# Patient Record
Sex: FEMALE | Race: WHITE | NOT HISPANIC OR LATINO | Employment: OTHER | URBAN - METROPOLITAN AREA
[De-identification: names, ages, dates, MRNs, and addresses within clinical notes are randomized per-mention and may not be internally consistent; named-entity substitution may affect disease eponyms.]

---

## 2017-02-08 RX ORDER — PREDNISONE 20 MG/1
TABLET ORAL
Qty: 90 TABLET | OUTPATIENT
Start: 2017-02-08

## 2017-02-10 ENCOUNTER — OFFICE VISIT (OUTPATIENT)
Dept: INTERNAL MEDICINE | Facility: CLINIC | Age: 57
End: 2017-02-10
Payer: MEDICARE

## 2017-02-10 VITALS
RESPIRATION RATE: 20 BRPM | HEART RATE: 88 BPM | DIASTOLIC BLOOD PRESSURE: 86 MMHG | OXYGEN SATURATION: 95 % | BODY MASS INDEX: 40.72 KG/M2 | SYSTOLIC BLOOD PRESSURE: 128 MMHG | WEIGHT: 194 LBS | HEIGHT: 58 IN

## 2017-02-10 DIAGNOSIS — M32.8 OTHER FORMS OF SYSTEMIC LUPUS ERYTHEMATOSUS, UNSPECIFIED ORGAN INVOLVEMENT STATUS: Primary | ICD-10-CM

## 2017-02-10 DIAGNOSIS — Z71.6 TOBACCO ABUSE COUNSELING: ICD-10-CM

## 2017-02-10 DIAGNOSIS — M85.80 OSTEOPENIA: ICD-10-CM

## 2017-02-10 DIAGNOSIS — M79.7 FIBROMYALGIA: ICD-10-CM

## 2017-02-10 DIAGNOSIS — F32.A DEPRESSION, UNSPECIFIED DEPRESSION TYPE: ICD-10-CM

## 2017-02-10 DIAGNOSIS — F11.20 UNCOMPLICATED OPIOID DEPENDENCE: ICD-10-CM

## 2017-02-10 DIAGNOSIS — K58.0 IRRITABLE BOWEL SYNDROME WITH DIARRHEA: ICD-10-CM

## 2017-02-10 DIAGNOSIS — I73.00 RAYNAUD'S DISEASE WITHOUT GANGRENE: ICD-10-CM

## 2017-02-10 DIAGNOSIS — Z72.0 TOBACCO ABUSE: ICD-10-CM

## 2017-02-10 PROCEDURE — 99999 PR PBB SHADOW E&M-EST. PATIENT-LVL IV: CPT | Mod: PBBFAC,,, | Performed by: NURSE PRACTITIONER

## 2017-02-10 PROCEDURE — 3074F SYST BP LT 130 MM HG: CPT | Mod: S$GLB,,, | Performed by: NURSE PRACTITIONER

## 2017-02-10 PROCEDURE — 99214 OFFICE O/P EST MOD 30 MIN: CPT | Mod: S$GLB,,, | Performed by: NURSE PRACTITIONER

## 2017-02-10 PROCEDURE — 3079F DIAST BP 80-89 MM HG: CPT | Mod: S$GLB,,, | Performed by: NURSE PRACTITIONER

## 2017-02-10 PROCEDURE — 99499 UNLISTED E&M SERVICE: CPT | Mod: S$GLB,,, | Performed by: NURSE PRACTITIONER

## 2017-02-10 RX ORDER — PREDNISONE 20 MG/1
20 TABLET ORAL DAILY
Qty: 90 TABLET | Refills: 0 | Status: SHIPPED | OUTPATIENT
Start: 2017-02-10 | End: 2017-08-01 | Stop reason: SDUPTHER

## 2017-02-10 RX ORDER — BUPROPION HYDROCHLORIDE 150 MG/1
150 TABLET ORAL DAILY
Qty: 30 TABLET | Refills: 5 | Status: SHIPPED | OUTPATIENT
Start: 2017-02-10 | End: 2017-08-01

## 2017-02-10 RX ORDER — AMLODIPINE BESYLATE 5 MG/1
5 TABLET ORAL 2 TIMES DAILY
Qty: 180 TABLET | Refills: 1 | Status: SHIPPED | OUTPATIENT
Start: 2017-02-10 | End: 2017-12-27 | Stop reason: SDUPTHER

## 2017-02-10 NOTE — MR AVS SNAPSHOT
[facility]  [facility]  [place] [address]  Phone: [phone]  Fax: [phone]                  Lena Zamarripa   2/10/2017 10:45 AM   Office Visit    Description:  Female : 1960   Provider:  Kathy Sánchez NP   Department:  [facility]           Reason for Visit     Follow-up     Depression           Diagnoses this Visit        Comments    Other forms of systemic lupus erythematosus, unspecified organ involvement status    -  Primary     Fibromyalgia         Irritable bowel syndrome with diarrhea         Depression, unspecified depression type         Uncomplicated opioid dependence         Raynaud's disease without gangrene         Osteopenia         BMI 40.0-44.9, adult         Tobacco abuse         Tobacco abuse counseling                To Do List           Goals (5 Years of Data)     None      Follow-Up and Disposition     Return if symptoms worsen or fail to improve.       These Medications        Disp Refills Start End    buPROPion (WELLBUTRIN XL) 150 MG TB24 tablet 30 tablet 5 2/10/2017 2/10/2018    Take 1 tablet (150 mg total) by mouth once daily. - Oral    Pharmacy: [facility] - [place], [place] - [address] Ph #: [phone]       amlodipine (NORVASC) 5 MG tablet 180 tablet 1 2/10/2017     Take 1 tablet (5 mg total) by mouth 2 (two) times daily. - Oral    Pharmacy: [facility] - [place], [place] - [address] Ph #: [phone]       predniSONE (DELTASONE) 20 MG tablet 90 tablet 0 2/10/2017     Take 1 tablet (20 mg total) by mouth once daily. - Oral    Pharmacy: [facility] - [place], [place] - [address] Ph #: [phone]         [facility] [place] [facility]     [facility] Care Line -  Assistance  Registered nurses in the [facility] provide clinical advisement, health education, appointment booking, and other advisory services.  Call for this free service at [phone].             Medications            Message regarding Medications     Verify the changes and/or additions to your medication regime listed below are the same as discussed with your clinician today.  If any of these changes or additions are incorrect, please notify your healthcare provider.        START taking these NEW medications        Refills    buPROPion (WELLBUTRIN XL) 150 MG TB24 tablet 5    Sig: Take 1 tablet (150 mg total) by mouth once daily.    Class: Normal    Route: Oral      CHANGE how you are taking these medications     Start Taking Instead of    predniSONE (DELTASONE) 20 MG tablet predniSONE (DELTASONE) 20 MG tablet    Dosage:  Take 1 tablet (20 mg total) by mouth once daily. Dosage:  TAKE 1 TABLET BY MOUTH EVERY DAY    Reason for Change:  Reorder            Verify that the below list of medications is an accurate representation of the medications you are currently taking.  If none reported, the list may be blank. If incorrect, please contact your healthcare provider. Carry this list with you in case of emergency.           Current Medications     amlodipine (NORVASC) 5 MG tablet Take 1 tablet (5 mg total) by mouth 2 (two) times daily.    CALCIUM CARB/MAGNESIUM CMB #10 (EDWINA-MAG ORAL) Take 2 capsules by mouth once daily.    chlorhexidine (PERIDEX) 0.12 % solution     diphenoxylate-atropine 2.5-0.025 mg (LOMOTIL) 2.5-0.025 mg per tablet Take 1 tablet by mouth 4 (four) times daily as needed for Diarrhea.    fentanyl (DURAGESIC) 12 mcg/hr PT72     fentaNYL (DURAGESIC) 25 mcg/hr 1 patch every 48 hours.     gabapentin (NEURONTIN) 300 MG capsule     hydrocodone-acetaminophen 10-325mg (NORCO)  mg Tab Take 1 tablet by mouth 3 (three) times daily.     hydroxychloroquine (PLAQUENIL) 200 mg tablet Take 200 mg by mouth 2 (two) times daily.    LORATADINE (ALAVERT ORAL) Take by mouth.    multivitamin (ONE DAILY MULTIVITAMIN) per tablet Take 1 tablet by mouth once daily.    OMEPRAZOLE/SODIUM BICARBONATE (ZEGERID ORAL) Take by mouth.     "predniSONE (DELTASONE) 20 MG tablet Take 1 tablet (20 mg total) by mouth once daily.    tizanidine (ZANAFLEX) 4 MG tablet Take 2 mg by mouth every 6 (six) hours as needed.     vitamin D 1000 units Tab Take 1,000 Units by mouth once daily.    buPROPion (WELLBUTRIN XL) 150 MG TB24 tablet Take 1 tablet (150 mg total) by mouth once daily.    triamcinolone acetonide 0.5% (KENALOG) 0.5 % Crea Apply topically 2 (two) times daily.    valacyclovir (VALTREX) 1000 MG tablet Take 1 tablet (1,000 mg total) by mouth 3 (three) times daily.           Clinical Reference Information           Your Vitals Were     BP Pulse Resp Height Weight Last Period    128/86 88 20 4' 10" (1.473 m) 88 kg (194 lb) 12/31/1999    SpO2 BMI             95% 40.55 kg/m2         Blood Pressure          Most Recent Value    BP  128/86      Allergies as of 2/10/2017     Peanut    Penicillins      Immunizations Administered on Date of Encounter - 2/10/2017     None      Instructions      Depression  Depression is one of the most common mental health problems today. It is not just a state of unhappiness or sadness. It is a true disease. The cause seems to be related to a decrease in chemicals that transmit signals in the brain. Having a family history of depression, alcoholism, or suicide increases the risk. Chronic illness, chronic pain, migraine headaches and high emotional stress also increase the risk.  Depression is something we tend to recognize in others, but may have a hard time seeing in ourselves. It can show in many physical and emotional ways:  · Loss of appetite  · Over-eating  · Not being able to sleep  · Sleeping too much  · Tiredness not related to physical exertion  · Restlessness or irritability  · Slowness of movement or speech  · Feeling depressed or withdrawn  · Loss of interest in things you once enjoyed  · Trouble concentrating, poor memory, trouble making decisions  · Thoughts of harming or killing oneself, or thoughts that life is not " worth living  · Low self-esteem  The treatment for depression may include both medicine and psychotherapy. Antidepressants can reduce suffering and can improve the ability to function during the depressed period. Therapy can offer emotional support and help you understand emotional factors that may be causing the depression.  Home care  · On-going care and support helps people manage this disease.  Find a healthcare provider and therapist who meet your needs. Seek help when you feel like you may be getting ill.  · Be kind to yourself. Make it a point to do things that you enjoy (gardening, walking in nature, going to a movie, etc.). Reward yourself for small successes.  · Take care of your physical body. Eat a balanced diet (low in saturated fat and high in fruits and vegetables). Exercise at least 3 times a week for 30 minutes. Even mild-moderate exercise (like brisk walking) can make you feel better.  · Avoid alcohol, which can make depression worse.  · Take medicine as prescribed.  · Tell each of your healthcare providers about all of the prescription drugs, over-the-counter medicines, vitamins, and supplements you take. Certain supplements interact with medicines and can result in dangerous side effects. Ask your pharmacist when you have questions about drug interactions.  · Talk with your family and trusted friends about your feelings and thoughts. Ask them to help you recognize behavior changes early so you can get help and, if needed, medicine can be adjusted.  Follow-up care  Follow up with your healthcare provider, or as advised.  Call 911  Call 911 if you:  · Have suicidal thoughts, a suicide plan, and the means to carry out the plan  · Have trouble breathing  · Are very confused  · Feel very drowsy or have trouble awakening  · Faint or lose consciousness  · Have new chest pain that becomes more severe, lasts longer, or spreads into your shoulder, arm, neck, jaw or back  When to seek medical advice  Call  your healthcare provider right away if any of these occur:  · Feeling extreme depression, fear, anxiety, or anger toward yourself or others  · Feeling out of control  · Feeling that you may try to harm yourself or another  · Hearing voices that others do not hear  · Seeing things that others do not see  · Cant sleep or eat for 3 days in a row  · Friends or family express concern over your behavior and ask you to seek help  Date Last Reviewed: 9/29/2015 © 2000-2016 [employer]. [address] [place] [address], [place] [address]. All rights reserved. This information is not intended as a substitute for professional medical care. Always follow your healthcare professional's instructions.        Depression Affects Your Mind and Body  Everyone feels sad or blue from time to time for a few days or weeks. Depression is when these feelings don't go away and they interfere with daily life.  Depression is a real illness. It makes you feel sad and helpless. It gets in the way of your life and relationships. It inhibits your ability to think and act. But, with help, you can feel better again.     When I was depressed, I felt awful. I was so tired all the time I could hardly think, but at night I couldnt fall asleep. My head hurt. My stomach hurt. I didnt know what was wrong with me.   Depression affects your whole body  Brain chemicals affect your body as well as your mood. So depression may do more than just make you feel low. You may also feel bad physically. Depression can:  · Cause trouble with mental tasks such as remembering, concentrating, or making decisions  · Make you feel nervous and jumpy  · Cause trouble sleeping. Or you may sleep too much  · Change your appetite  · Cause headaches, stomachaches, or other aches and pains  · Drain your body of energy  Depression and other illness  It is common for people who have chronic health problems to also have depression. It can often be hard to tell which one  caused the other. A person might become depressed after finding out they have a health problem. But some studies suggest being depressed may make certain health problems more likely. And some depressed people stop taking care of themselves. This may make them more likely to get sick.  Date Last Reviewed: 1/19/2015  © 8737-4837 [employer]. [address] [place] [address], [place] [address]. All rights reserved. This information is not intended as a substitute for professional medical care. Always follow your healthcare professional's instructions.        Planning to Quit Smoking  Your healthcare provider may have told you that you need to give up tobacco. Only you can decide if and when you are ready to quit. Quitting is hard to do. But the benefits will be worth it. When you  decide to quit, come up with a plan thats right for you. Discuss your plan with your healthcare provider. And talk to your provider about medicines to help you quit.    Line up support  To quit smoking, youll need a plan and some help. Pick a date within the next 2 to 4 weeks to quit. Use the time between now and that date to arrange for support.  · Classes and counselors. Quit-smoking classes  people like you through the process. Get to know others in a class, and support each other beyond the class. Telephone counseling also helps you keep on track. Ask your healthcare provider, local hospital, or public health department to put you in touch with a class and a phone counselor.  · Family and friends. Tell your family and friends about your quit date. Ask them to support your change. If they smoke, arrange to see them in smoke-free places. Forbid smoking in your home.     Finding something to replace cigarettes may be hard to do. Be aware that some things you choose may be as harmful as cigarettes:  · Smokeless (chewing) tobacco is just as harmful as regular tobacco. Tobacco should not be used as a substitute for  cigarettes.  · Herbal medicines or teas may affect how your body handles nicotine. Talk to your healthcare provider before using these products.  · E-cigarettes have less toxins than the smoke from a regular cigarette. But the FDA says that these devices may still have substances that can cause cancer. E-cigarettes are not well regulated. They have not been studied enough to know if they are a good aid to help you stop smoking. Talk with your healthcare provider befor using these products.      Quit-smoking products  Many products can help you quit smoking. Some are prescription medicines that help curb your cravings and withdrawal symptoms. Other products slowly lessen the level of nicotine your body absorbs. Nicotine is the highly addictive substance found in cigarettes, cigars, and chewing tobacco. Nicotine replacement products can help get your body used to slowly decreasing amounts of nicotine after you quit smoking. These products include a nicotine patch, gum, lozenge, nasal spray, and inhaler. Be sure you follow the directions for your medicine or product carefully. Your healthcare provider may tell you to start taking the prescription medicine a week before you plan to quit. Do not smoke while you use nicotine products. Doing so can be very harmful to your health.  For more information  · smokefree.gov/xkbo-fm-ym-expert  · National Cancer [place] Smoking Quitline: 877-44U-QUIT (712-756-5351)   Date Last Reviewed: 11/12/2014  © 3693-4302 [employer]. [address] [place] [address]. All rights reserved. This information is not intended as a substitute for professional medical care. Always follow your healthcare professional's instructions.             Smoking Cessation     If you would like to quit smoking:   You may be eligible for free services if you are a [place] resident and started smoking cigarettes before September 1, 1988.  Call the Smoking Cessation Trust (SCT) toll  free at (255) 118-7671 or (749) 587-4933.   Call 1-800-QUIT-NOW if you do not meet the above criteria.            Language Assistance Services     ATTENTION: Language assistance services are available, free of charge. Please call [phone].      ATENCIÓN: Si habla natachaañol, tiene a waller disposición servicios gratuitos de asistencia lingüística. Llame al [phone].     CHÚ Ý: N?u b?n nói Ti?ng Vi?t, có các d?ch v? h? tr? ngôn ng? mi?n phí dành cho b?n. G?i s? [phone].         [facility] complies with applicable Federal civil rights laws and does not discriminate on the basis of race, color, national origin, age, disability, or sex.

## 2017-02-10 NOTE — PROGRESS NOTES
Subjective:       Patient ID: Lena Zamarripa is a 57 y.o. female.    Chief Complaint: Follow-up (check up with refills) and Depression (consult for medication )    Patient is known, to me and presents with   Chief Complaint   Patient presents with    Follow-up     check up with refills    Depression     consult for medication    .  Denies chest pain and shortness of breath.  Patient presents with check up and is undergoing depression for a while. She is not suicidal or homicidal ideations. She has a lot of medical issues.  Does not want any blood work or screenings at this time due to cost.  HPI  Review of Systems   Constitutional: Negative for activity change, appetite change, fatigue, fever and unexpected weight change.   HENT: Negative for congestion, ear discharge, ear pain, hearing loss, postnasal drip and tinnitus.    Eyes: Negative for photophobia, pain and visual disturbance.   Respiratory: Negative for cough, shortness of breath, wheezing and stridor.    Cardiovascular: Negative for chest pain, palpitations and leg swelling.   Gastrointestinal: Negative for abdominal distention.   Genitourinary: Negative for difficulty urinating, dysuria, frequency, hematuria and urgency.   Musculoskeletal: Positive for arthralgias. Negative for back pain, gait problem, joint swelling and neck pain.   Skin: Negative.    Neurological: Negative for dizziness, seizures, syncope, weakness, light-headedness, numbness and headaches.   Hematological: Negative for adenopathy. Does not bruise/bleed easily.   Psychiatric/Behavioral: Negative for behavioral problems, confusion, hallucinations, sleep disturbance and suicidal ideas. The patient is not nervous/anxious.        Objective:      Physical Exam   Constitutional: She is oriented to person, place, and time. She appears well-developed and well-nourished. No distress.   HENT:   Head: Normocephalic and atraumatic.   Right Ear: External ear normal.   Left Ear: External ear  normal.   Mouth/Throat: Oropharynx is clear and moist. No oropharyngeal exudate.   Eyes: Conjunctivae and EOM are normal. Pupils are equal, round, and reactive to light. Right eye exhibits no discharge. Left eye exhibits no discharge.   Neck: Normal range of motion. Neck supple. No JVD present. No thyromegaly present.   Cardiovascular: Normal rate, regular rhythm, normal heart sounds and intact distal pulses.  Exam reveals no gallop and no friction rub.    No murmur heard.  Pulmonary/Chest: Effort normal and breath sounds normal. No stridor. No respiratory distress. She has no wheezes. She has no rales. She exhibits no tenderness.   Abdominal: Soft. Bowel sounds are normal. She exhibits no distension and no mass. There is no tenderness. There is no rebound.   Musculoskeletal: Normal range of motion. She exhibits no edema or tenderness.   Lymphadenopathy:     She has no cervical adenopathy.   Neurological: She is alert and oriented to person, place, and time. She has normal reflexes. She displays normal reflexes. No cranial nerve deficit. She exhibits normal muscle tone. Coordination normal.   Skin: Skin is warm and dry. No rash noted. No erythema. No pallor.   Psychiatric: She has a normal mood and affect. Her behavior is normal. Judgment and thought content normal.       Assessment:       1. Other forms of systemic lupus erythematosus, unspecified organ involvement status    2. Fibromyalgia    3. Irritable bowel syndrome with diarrhea    4. Depression, unspecified depression type    5. Uncomplicated opioid dependence    6. Raynaud's disease without gangrene    7. Osteopenia    8. BMI 40.0-44.9, adult    9. Tobacco abuse    10. Tobacco abuse counseling        Plan:   Lena was seen today for follow-up and depression.    Diagnoses and all orders for this visit:    Other forms of systemic lupus erythematosus, unspecified organ involvement status  -     predniSONE (DELTASONE) 20 MG tablet; Take 1 tablet (20 mg total)  "by mouth once daily.    Fibromyalgia    Irritable bowel syndrome with diarrhea    Depression, unspecified depression type  -     buPROPion (WELLBUTRIN XL) 150 MG TB24 tablet; Take 1 tablet (150 mg total) by mouth once daily.    Uncomplicated opioid dependence    Raynaud's disease without gangrene  -     amlodipine (NORVASC) 5 MG tablet; Take 1 tablet (5 mg total) by mouth 2 (two) times daily.    Osteopenia    BMI 40.0-44.9, adult    Tobacco abuse    Tobacco abuse counseling  "This note will not be shared with the patient.".  If any thoughts of suicide or homicide occur stop meds and go to ER  Continue with plan of care  RTC as scheduled  "

## 2017-02-10 NOTE — PATIENT INSTRUCTIONS
Depression  Depression is one of the most common mental health problems today. It is not just a state of unhappiness or sadness. It is a true disease. The cause seems to be related to a decrease in chemicals that transmit signals in the brain. Having a family history of depression, alcoholism, or suicide increases the risk. Chronic illness, chronic pain, migraine headaches and high emotional stress also increase the risk.  Depression is something we tend to recognize in others, but may have a hard time seeing in ourselves. It can show in many physical and emotional ways:  · Loss of appetite  · Over-eating  · Not being able to sleep  · Sleeping too much  · Tiredness not related to physical exertion  · Restlessness or irritability  · Slowness of movement or speech  · Feeling depressed or withdrawn  · Loss of interest in things you once enjoyed  · Trouble concentrating, poor memory, trouble making decisions  · Thoughts of harming or killing oneself, or thoughts that life is not worth living  · Low self-esteem  The treatment for depression may include both medicine and psychotherapy. Antidepressants can reduce suffering and can improve the ability to function during the depressed period. Therapy can offer emotional support and help you understand emotional factors that may be causing the depression.  Home care  · On-going care and support helps people manage this disease.  Find a healthcare provider and therapist who meet your needs. Seek help when you feel like you may be getting ill.  · Be kind to yourself. Make it a point to do things that you enjoy (gardening, walking in nature, going to a movie, etc.). Reward yourself for small successes.  · Take care of your physical body. Eat a balanced diet (low in saturated fat and high in fruits and vegetables). Exercise at least 3 times a week for 30 minutes. Even mild-moderate exercise (like brisk walking) can make you feel better.  · Avoid alcohol, which can make  depression worse.  · Take medicine as prescribed.  · Tell each of your healthcare providers about all of the prescription drugs, over-the-counter medicines, vitamins, and supplements you take. Certain supplements interact with medicines and can result in dangerous side effects. Ask your pharmacist when you have questions about drug interactions.  · Talk with your family and trusted friends about your feelings and thoughts. Ask them to help you recognize behavior changes early so you can get help and, if needed, medicine can be adjusted.  Follow-up care  Follow up with your healthcare provider, or as advised.  Call 911  Call 911 if you:  · Have suicidal thoughts, a suicide plan, and the means to carry out the plan  · Have trouble breathing  · Are very confused  · Feel very drowsy or have trouble awakening  · Faint or lose consciousness  · Have new chest pain that becomes more severe, lasts longer, or spreads into your shoulder, arm, neck, jaw or back  When to seek medical advice  Call your healthcare provider right away if any of these occur:  · Feeling extreme depression, fear, anxiety, or anger toward yourself or others  · Feeling out of control  · Feeling that you may try to harm yourself or another  · Hearing voices that others do not hear  · Seeing things that others do not see  · Cant sleep or eat for 3 days in a row  · Friends or family express concern over your behavior and ask you to seek help  Date Last Reviewed: 9/29/2015  © 5616-0850 [employer]. [address] [place] [address]. All rights reserved. This information is not intended as a substitute for professional medical care. Always follow your healthcare professional's instructions.        Depression Affects Your Mind and Body  Everyone feels sad or blue from time to time for a few days or weeks. Depression is when these feelings don't go away and they interfere with daily life.  Depression is a real illness. It makes you  feel sad and helpless. It gets in the way of your life and relationships. It inhibits your ability to think and act. But, with help, you can feel better again.     When I was depressed, I felt awful. I was so tired all the time I could hardly think, but at night I couldnt fall asleep. My head hurt. My stomach hurt. I didnt know what was wrong with me.   Depression affects your whole body  Brain chemicals affect your body as well as your mood. So depression may do more than just make you feel low. You may also feel bad physically. Depression can:  · Cause trouble with mental tasks such as remembering, concentrating, or making decisions  · Make you feel nervous and jumpy  · Cause trouble sleeping. Or you may sleep too much  · Change your appetite  · Cause headaches, stomachaches, or other aches and pains  · Drain your body of energy  Depression and other illness  It is common for people who have chronic health problems to also have depression. It can often be hard to tell which one caused the other. A person might become depressed after finding out they have a health problem. But some studies suggest being depressed may make certain health problems more likely. And some depressed people stop taking care of themselves. This may make them more likely to get sick.  Date Last Reviewed: 1/19/2015 © 2000-2016 [employer]. [address] [place] [address], [place] [address]. All rights reserved. This information is not intended as a substitute for professional medical care. Always follow your healthcare professional's instructions.        Planning to Quit Smoking  Your healthcare provider may have told you that you need to give up tobacco. Only you can decide if and when you are ready to quit. Quitting is hard to do. But the benefits will be worth it. When you  decide to quit, come up with a plan thats right for you. Discuss your plan with your healthcare provider. And talk to your provider about medicines to help  you quit.    Line up support  To quit smoking, youll need a plan and some help. Pick a date within the next 2 to 4 weeks to quit. Use the time between now and that date to arrange for support.  · Classes and counselors. Quit-smoking classes  people like you through the process. Get to know others in a class, and support each other beyond the class. Telephone counseling also helps you keep on track. Ask your healthcare provider, local hospital, or public health department to put you in touch with a class and a phone counselor.  · Family and friends. Tell your family and friends about your quit date. Ask them to support your change. If they smoke, arrange to see them in smoke-free places. Forbid smoking in your home.     Finding something to replace cigarettes may be hard to do. Be aware that some things you choose may be as harmful as cigarettes:  · Smokeless (chewing) tobacco is just as harmful as regular tobacco. Tobacco should not be used as a substitute for cigarettes.  · Herbal medicines or teas may affect how your body handles nicotine. Talk to your healthcare provider before using these products.  · E-cigarettes have less toxins than the smoke from a regular cigarette. But the FDA says that these devices may still have substances that can cause cancer. E-cigarettes are not well regulated. They have not been studied enough to know if they are a good aid to help you stop smoking. Talk with your healthcare provider befor using these products.      Quit-smoking products  Many products can help you quit smoking. Some are prescription medicines that help curb your cravings and withdrawal symptoms. Other products slowly lessen the level of nicotine your body absorbs. Nicotine is the highly addictive substance found in cigarettes, cigars, and chewing tobacco. Nicotine replacement products can help get your body used to slowly decreasing amounts of nicotine after you quit smoking. These products include a  nicotine patch, gum, lozenge, nasal spray, and inhaler. Be sure you follow the directions for your medicine or product carefully. Your healthcare provider may tell you to start taking the prescription medicine a week before you plan to quit. Do not smoke while you use nicotine products. Doing so can be very harmful to your health.  For more information  · smokefree.gov/tcoh-wv-ti-expert  · [employer] [place] Smoking Quitline: 877-44U-QUIT (963-002-4701)   Date Last Reviewed: 11/12/2014  © 6416-7843 [employer]. [address] [place] [address]. All rights reserved. This information is not intended as a substitute for professional medical care. Always follow your healthcare professional's instructions.

## 2017-02-13 ENCOUNTER — PATIENT MESSAGE (OUTPATIENT)
Dept: INTERNAL MEDICINE | Facility: CLINIC | Age: 57
End: 2017-02-13

## 2017-03-06 ENCOUNTER — PATIENT MESSAGE (OUTPATIENT)
Dept: INTERNAL MEDICINE | Facility: CLINIC | Age: 57
End: 2017-03-06

## 2017-03-06 ENCOUNTER — TELEPHONE (OUTPATIENT)
Dept: INTERNAL MEDICINE | Facility: CLINIC | Age: 57
End: 2017-03-06

## 2017-03-06 DIAGNOSIS — B96.89 ACUTE BACTERIAL BRONCHITIS: Primary | ICD-10-CM

## 2017-03-06 DIAGNOSIS — J20.8 ACUTE BACTERIAL BRONCHITIS: Primary | ICD-10-CM

## 2017-03-06 RX ORDER — AZITHROMYCIN 250 MG/1
TABLET, FILM COATED ORAL
Qty: 6 TABLET | Refills: 2 | Status: SHIPPED | OUTPATIENT
Start: 2017-03-06 | End: 2017-03-11

## 2017-05-19 ENCOUNTER — PATIENT MESSAGE (OUTPATIENT)
Dept: INTERNAL MEDICINE | Facility: CLINIC | Age: 57
End: 2017-05-19

## 2017-05-19 ENCOUNTER — TELEPHONE (OUTPATIENT)
Dept: INTERNAL MEDICINE | Facility: CLINIC | Age: 57
End: 2017-05-19

## 2017-05-19 RX ORDER — HYDROXYCHLOROQUINE SULFATE 200 MG/1
200 TABLET, FILM COATED ORAL 2 TIMES DAILY
Qty: 60 TABLET | Refills: 5 | Status: SHIPPED | OUTPATIENT
Start: 2017-05-19 | End: 2018-07-19

## 2017-05-19 RX ORDER — AZITHROMYCIN 250 MG/1
TABLET, FILM COATED ORAL
Qty: 6 TABLET | Refills: 0 | Status: SHIPPED | OUTPATIENT
Start: 2017-05-19 | End: 2017-05-24

## 2017-07-10 DIAGNOSIS — R19.7 DIARRHEA, UNSPECIFIED TYPE: ICD-10-CM

## 2017-07-10 RX ORDER — DIPHENOXYLATE HYDROCHLORIDE AND ATROPINE SULFATE 2.5; .025 MG/1; MG/1
1 TABLET ORAL 4 TIMES DAILY PRN
Qty: 30 TABLET | Refills: 5 | Status: SHIPPED | OUTPATIENT
Start: 2017-07-10 | End: 2018-04-19 | Stop reason: SDUPTHER

## 2017-07-10 NOTE — TELEPHONE ENCOUNTER
Requested Prescriptions     Pending Prescriptions Disp Refills    diphenoxylate-atropine 2.5-0.025 mg (LOMOTIL) 2.5-0.025 mg per tablet 30 tablet 5     Sig: Take 1 tablet by mouth 4 (four) times daily as needed for Diarrhea.

## 2017-07-17 ENCOUNTER — PATIENT MESSAGE (OUTPATIENT)
Dept: INTERNAL MEDICINE | Facility: CLINIC | Age: 57
End: 2017-07-17

## 2017-07-24 ENCOUNTER — PATIENT MESSAGE (OUTPATIENT)
Dept: INTERNAL MEDICINE | Facility: CLINIC | Age: 57
End: 2017-07-24

## 2017-07-24 DIAGNOSIS — I10 BENIGN ESSENTIAL HTN: Primary | ICD-10-CM

## 2017-07-24 DIAGNOSIS — M32.9 SYSTEMIC LUPUS ERYTHEMATOSUS, UNSPECIFIED SLE TYPE, UNSPECIFIED ORGAN INVOLVEMENT STATUS: ICD-10-CM

## 2017-07-25 ENCOUNTER — PATIENT MESSAGE (OUTPATIENT)
Dept: INTERNAL MEDICINE | Facility: CLINIC | Age: 57
End: 2017-07-25

## 2017-07-26 ENCOUNTER — PATIENT MESSAGE (OUTPATIENT)
Dept: INTERNAL MEDICINE | Facility: CLINIC | Age: 57
End: 2017-07-26

## 2017-07-27 LAB
CHOLESTEROL, TOTAL: 224 MG/DL (ref 100–199)
HDLC SERPL-MCNC: 53 MG/DL (ref 39–?)
LDLC SERPL CALC-MCNC: 117 MG/DL (ref 0–99)
TRIGL SERPL-MCNC: 270 MG/DL (ref 0–149)
VLDLC SERPL-MCNC: 54 MG/DL (ref 5–40)

## 2017-07-28 LAB
ALBUMIN SERPL-MCNC: 4.3 G/DL (ref 3.5–5.5)
ALBUMIN/GLOB SERPL: 1.9 {RATIO} (ref 1.2–2.2)
ALP SERPL-CCNC: 115 IU/L (ref 39–117)
ALT SERPL-CCNC: 22 IU/L (ref 0–32)
AMBIG ABBREV CMP 14 DEFAULT: NORMAL
AMBIG ABBREV LP DEFAULT: NORMAL
AST SERPL-CCNC: 19 IU/L (ref 0–40)
BASOPHILS # BLD AUTO: 0 X10E3/UL (ref 0–0.2)
BASOPHILS NFR BLD AUTO: 0 %
BILIRUB SERPL-MCNC: 0.3 MG/DL (ref 0–1.2)
BUN SERPL-MCNC: 13 MG/DL (ref 6–24)
BUN/CREAT SERPL: 18 (ref 9–23)
CALCIUM SERPL-MCNC: 9.9 MG/DL (ref 8.7–10.2)
CHLORIDE SERPL-SCNC: 104 MMOL/L (ref 96–106)
CHOLEST SERPL-MCNC: 224 MG/DL (ref 100–199)
CO2 SERPL-SCNC: 25 MMOL/L (ref 18–29)
CREAT SERPL-MCNC: 0.72 MG/DL (ref 0.57–1)
EOSINOPHIL # BLD AUTO: 1.4 X10E3/UL (ref 0–0.4)
EOSINOPHIL NFR BLD AUTO: 10 %
ERYTHROCYTE [DISTWIDTH] IN BLOOD BY AUTOMATED COUNT: 14.8 % (ref 12.3–15.4)
GLOBULIN SER CALC-MCNC: 2.3 G/DL (ref 1.5–4.5)
GLUCOSE SERPL-MCNC: 96 MG/DL (ref 65–99)
HCT VFR BLD AUTO: 47 % (ref 34–46.6)
HDLC SERPL-MCNC: 53 MG/DL
HGB BLD-MCNC: 14.9 G/DL (ref 11.1–15.9)
LDLC SERPL CALC-MCNC: 117 MG/DL (ref 0–99)
LYMPHOCYTES # BLD AUTO: 4.4 X10E3/UL (ref 0.7–3.1)
LYMPHOCYTES NFR BLD AUTO: 31 %
MCH RBC QN AUTO: 26.4 PG (ref 26.6–33)
MCHC RBC AUTO-ENTMCNC: 31.7 G/DL (ref 31.5–35.7)
MCV RBC AUTO: 83 FL (ref 79–97)
MONOCYTES # BLD AUTO: 1 X10E3/UL (ref 0.1–0.9)
MONOCYTES NFR BLD AUTO: 7 %
MORPHOLOGY BLD-IMP: ABNORMAL
NEUTROPHILS # BLD AUTO: 7.4 X10E3/UL (ref 1.4–7)
NEUTROPHILS NFR BLD AUTO: 52 %
PLATELET # BLD AUTO: 378 X10E3/UL (ref 150–379)
POTASSIUM SERPL-SCNC: 4.8 MMOL/L (ref 3.5–5.2)
PROT SERPL-MCNC: 6.6 G/DL (ref 6–8.5)
RBC # BLD AUTO: 5.64 X10E6/UL (ref 3.77–5.28)
SODIUM SERPL-SCNC: 148 MMOL/L (ref 134–144)
TRIGL SERPL-MCNC: 270 MG/DL (ref 0–149)
TSH SERPL DL<=0.005 MIU/L-ACNC: 2.05 UIU/ML (ref 0.45–4.5)
VLDLC SERPL CALC-MCNC: 54 MG/DL (ref 5–40)
WBC # BLD AUTO: 14.2 X10E3/UL (ref 3.4–10.8)

## 2017-08-01 ENCOUNTER — OFFICE VISIT (OUTPATIENT)
Dept: INTERNAL MEDICINE | Facility: CLINIC | Age: 57
End: 2017-08-01
Payer: MEDICARE

## 2017-08-01 VITALS
HEART RATE: 105 BPM | SYSTOLIC BLOOD PRESSURE: 124 MMHG | HEIGHT: 58 IN | BODY MASS INDEX: 41.56 KG/M2 | OXYGEN SATURATION: 95 % | WEIGHT: 198 LBS | RESPIRATION RATE: 20 BRPM | DIASTOLIC BLOOD PRESSURE: 88 MMHG

## 2017-08-01 DIAGNOSIS — D72.829 LEUKOCYTOSIS, UNSPECIFIED TYPE: ICD-10-CM

## 2017-08-01 DIAGNOSIS — I10 BENIGN ESSENTIAL HTN: ICD-10-CM

## 2017-08-01 DIAGNOSIS — R06.83 SNORES: ICD-10-CM

## 2017-08-01 DIAGNOSIS — I73.00 RAYNAUD'S DISEASE WITHOUT GANGRENE: ICD-10-CM

## 2017-08-01 DIAGNOSIS — E66.2 MORBID (SEVERE) OBESITY WITH ALVEOLAR HYPOVENTILATION: ICD-10-CM

## 2017-08-01 DIAGNOSIS — M32.8 OTHER FORMS OF SYSTEMIC LUPUS ERYTHEMATOSUS, UNSPECIFIED ORGAN INVOLVEMENT STATUS: ICD-10-CM

## 2017-08-01 DIAGNOSIS — F32.A DEPRESSION, UNSPECIFIED DEPRESSION TYPE: ICD-10-CM

## 2017-08-01 DIAGNOSIS — Z72.0 TOBACCO ABUSE: ICD-10-CM

## 2017-08-01 DIAGNOSIS — E78.5 HYPERLIPIDEMIA, UNSPECIFIED HYPERLIPIDEMIA TYPE: ICD-10-CM

## 2017-08-01 DIAGNOSIS — Z71.6 TOBACCO ABUSE COUNSELING: ICD-10-CM

## 2017-08-01 PROCEDURE — 90670 PCV13 VACCINE IM: CPT | Mod: S$GLB,ICN,, | Performed by: NURSE PRACTITIONER

## 2017-08-01 PROCEDURE — 99499 UNLISTED E&M SERVICE: CPT | Mod: S$GLB,,, | Performed by: NURSE PRACTITIONER

## 2017-08-01 PROCEDURE — 99999 PR PBB SHADOW E&M-EST. PATIENT-LVL IV: CPT | Mod: PBBFAC,,, | Performed by: NURSE PRACTITIONER

## 2017-08-01 PROCEDURE — 99215 OFFICE O/P EST HI 40 MIN: CPT | Mod: 25,S$GLB,ICN, | Performed by: NURSE PRACTITIONER

## 2017-08-01 PROCEDURE — G0009 ADMIN PNEUMOCOCCAL VACCINE: HCPCS | Mod: S$GLB,ICN,, | Performed by: NURSE PRACTITIONER

## 2017-08-01 RX ORDER — DULOXETIN HYDROCHLORIDE 30 MG/1
30 CAPSULE, DELAYED RELEASE ORAL DAILY
Qty: 30 CAPSULE | Refills: 5 | Status: SHIPPED | OUTPATIENT
Start: 2017-08-01 | End: 2018-01-29 | Stop reason: SDUPTHER

## 2017-08-01 RX ORDER — FUROSEMIDE 20 MG/1
20 TABLET ORAL DAILY
Qty: 30 TABLET | Refills: 5 | Status: SHIPPED | OUTPATIENT
Start: 2017-08-01 | End: 2022-12-12 | Stop reason: SDUPTHER

## 2017-08-01 RX ORDER — PREDNISONE 20 MG/1
20 TABLET ORAL DAILY
Qty: 90 TABLET | Refills: 0 | Status: SHIPPED | OUTPATIENT
Start: 2017-08-01 | End: 2018-01-29 | Stop reason: SDUPTHER

## 2017-08-01 RX ORDER — IBUPROFEN 200 MG
1 TABLET ORAL DAILY
Qty: 30 PATCH | Refills: 2 | Status: SHIPPED | OUTPATIENT
Start: 2017-08-01 | End: 2018-07-20 | Stop reason: SDUPTHER

## 2017-08-01 RX ORDER — ROSUVASTATIN CALCIUM 5 MG/1
5 TABLET, COATED ORAL NIGHTLY
Qty: 30 TABLET | Refills: 5 | Status: SHIPPED | OUTPATIENT
Start: 2017-08-01 | End: 2018-03-31 | Stop reason: SDUPTHER

## 2017-08-01 NOTE — PROGRESS NOTES
Subjective:       Patient ID: Lena Zamarripa is a 57 y.o. female.    Chief Complaint: Follow-up (check up with results)    Patient is known, to me and presents with   Chief Complaint   Patient presents with    Follow-up     check up with results   .  Denies chest pain and shortness of breath.  Patient presents with check up and blood work review. Patient is on prednisone for lupus. She is up to date with mammogram. She is up to date with colonoscopy . Wants to do nicoderm patch. Would like to try another medication besides wellbutrin due to sedation affects. No SI/HI noted. Does snore a lot as well  HPI  Review of Systems   Constitutional: Positive for activity change and unexpected weight change. Negative for appetite change, fatigue and fever.   HENT: Negative for congestion, ear discharge, ear pain, hearing loss, postnasal drip, rhinorrhea, tinnitus and trouble swallowing.    Eyes: Negative for photophobia, pain, discharge and visual disturbance.   Respiratory: Negative for cough, chest tightness, shortness of breath, wheezing and stridor.    Cardiovascular: Negative for chest pain, palpitations and leg swelling.   Gastrointestinal: Negative for abdominal distention, blood in stool, constipation, diarrhea and vomiting.   Endocrine: Positive for polydipsia. Negative for polyuria.   Genitourinary: Negative for difficulty urinating, dysuria, frequency, hematuria, menstrual problem and urgency.   Musculoskeletal: Positive for arthralgias and joint swelling. Negative for back pain, gait problem and neck pain.   Skin: Negative.    Neurological: Positive for weakness. Negative for dizziness, seizures, syncope, light-headedness, numbness and headaches.   Hematological: Negative for adenopathy. Does not bruise/bleed easily.   Psychiatric/Behavioral: Positive for dysphoric mood. Negative for behavioral problems, confusion, hallucinations, sleep disturbance and suicidal ideas. The patient is not nervous/anxious.         Objective:      Physical Exam   Constitutional: She is oriented to person, place, and time. She appears well-developed and well-nourished. No distress.   HENT:   Head: Normocephalic and atraumatic.   Right Ear: External ear normal.   Left Ear: External ear normal.   Mouth/Throat: Oropharynx is clear and moist. No oropharyngeal exudate.   Eyes: Conjunctivae and EOM are normal. Pupils are equal, round, and reactive to light. Right eye exhibits no discharge. Left eye exhibits no discharge.   Neck: Normal range of motion. Neck supple. No JVD present. No thyromegaly present.   Cardiovascular: Normal rate, regular rhythm, normal heart sounds and intact distal pulses.  Exam reveals no gallop and no friction rub.    No murmur heard.  Pulmonary/Chest: Effort normal and breath sounds normal. No stridor. No respiratory distress. She has no wheezes. She has no rales. She exhibits no tenderness.   Abdominal: Soft. Bowel sounds are normal. She exhibits no distension and no mass. There is no tenderness. There is no rebound.   Musculoskeletal: Normal range of motion. She exhibits no edema or tenderness.   Lymphadenopathy:     She has no cervical adenopathy.   Neurological: She is alert and oriented to person, place, and time. She has normal reflexes. She displays normal reflexes. No cranial nerve deficit. She exhibits normal muscle tone. Coordination normal.   Skin: Skin is warm and dry. Capillary refill takes less than 2 seconds. No rash noted. No erythema. No pallor.   Psychiatric: She has a normal mood and affect. Her behavior is normal. Judgment and thought content normal.   Negative suicidal or homicidal ideations       Assessment:       1. Hyperlipidemia, unspecified hyperlipidemia type    2. Benign essential HTN    3. Other forms of systemic lupus erythematosus, unspecified organ involvement status    4. Leukocytosis, unspecified type    5. Snores    6. Raynaud's disease without gangrene    7. Depression, unspecified  depression type    8. BMI 40.0-44.9, adult    9. Tobacco abuse    10. Tobacco abuse counseling    11. Morbid (severe) obesity with alveolar hypoventilation         Plan:   Lena was seen today for follow-up.    Diagnoses and all orders for this visit:    Hyperlipidemia, unspecified hyperlipidemia type  -     rosuvastatin (CRESTOR) 5 MG tablet; Take 1 tablet (5 mg total) by mouth every evening.  -     Hepatic function panel; Future  -     Hepatic function panel  -     (In Office Administered) Pneumococcal Conjugate Vaccine (13 Valent) (IM)    Benign essential HTN  -     furosemide (LASIX) 20 MG tablet; Take 1 tablet (20 mg total) by mouth once daily.  -     (In Office Administered) Pneumococcal Conjugate Vaccine (13 Valent) (IM)    Other forms of systemic lupus erythematosus, unspecified organ involvement status  -     predniSONE (DELTASONE) 20 MG tablet; Take 1 tablet (20 mg total) by mouth once daily.  -     (In Office Administered) Pneumococcal Conjugate Vaccine (13 Valent) (IM)    Leukocytosis, unspecified type  -     Polysomnography 4 or more parameters with CPAP; Future  -     (In Office Administered) Pneumococcal Conjugate Vaccine (13 Valent) (IM)    Snores  -     Polysomnography 4 or more parameters with CPAP; Future  -     (In Office Administered) Pneumococcal Conjugate Vaccine (13 Valent) (IM)    Raynaud's disease without gangrene  -     (In Office Administered) Pneumococcal Conjugate Vaccine (13 Valent) (IM)    Depression, unspecified depression type  -     duloxetine (CYMBALTA) 30 MG capsule; Take 1 capsule (30 mg total) by mouth once daily.  -     (In Office Administered) Pneumococcal Conjugate Vaccine (13 Valent) (IM)    BMI 40.0-44.9, adult  -     Polysomnography 4 or more parameters with CPAP; Future  -     (In Office Administered) Pneumococcal Conjugate Vaccine (13 Valent) (IM)    Tobacco abuse  -     nicotine (NICODERM CQ) 21 mg/24 hr; Place 1 patch onto the skin once daily.  -     (In Office  "Administered) Pneumococcal Conjugate Vaccine (13 Valent) (IM)    Tobacco abuse counseling  -     nicotine (NICODERM CQ) 21 mg/24 hr; Place 1 patch onto the skin once daily.    Morbid (severe) obesity with alveolar hypoventilation   -     Polysomnography 4 or more parameters with CPAP; Future    "This note will not be shared with the patient."  Lab drawn today CBC, CMP, TSH, FLP  Limit the cholesterol in your diet to less than 300 mg per day.  Fats should contribute no more than 20 to 35% of your daily calories.  Less than 7 to 10% of your calories should come from saturated fat.  Avoid saturated fat products e.g., butter, some oils, meat, and poultry fat contain a lot of saturated fat.  Check food labels for fat and cholesterol content. Choose the foods with less fat per serving.  Limit the amount of butter and margarine you eat.  Use salad dressings and margarine made with polyunsaturated and monunsaturated fats.  Use egg whites or egg substitutes rather than whole eggs.  Replace whole-milk dairy products with nonfat or low-fat mild, cheese, spreads, and yogurt.  Eat skinless chicken, turkey, fish, and meatless entrees more often than red meat.  Choose lean cuts of meat and trim off all visible fat. Keep portion sizes moderate.  Avoid fatty desserts such as ice cream, cream-filled cakes, and cheesecakes. Choose fresh fruits, nonfat frozen yogurt, Popsicles, etc.  Reduce the amount of fried foods, vending machine food, and fast food you eat.  Eat fruits and vegetables (especially fresh fruits and leafy vegetables), beans, and whole grains daily. The fiber in these foods helps lower cholesterol.  Look for low-fat or nonfat varieties of the foods you like to eat or look for substitutes.  You may need to exercise 60 minutes a day to prevent weight gain and 90 minutes a day to lose weight.  Will do above testing   Has blood work yearly   RTC in six months.  "

## 2017-08-01 NOTE — PATIENT INSTRUCTIONS
"  Understanding Body Mass Index (BMI)  Body mass index (BMI) is a method of screening for a weight category using the ratio of your height to your weight. The BMI is a measure of overweight that is corrected for height. Knowing your BMI is a way to tell if you are at a healthy weight. The higher your BMI, the greater your risk for weight-related health problems.  What BMI means  · BMI below 18.5: Underweight  · BMI 18.5 to 24.9: Healthy weight or "ideal body weight"   · BMI 25 to 29.9: Overweight  · BMI 30 and over: Obese  · BMI 40 and over: Severe obesity   Online BMI Calculators  Find your BMI with an online BMI calculator tool, such as these from the CDC:  · BMI calculator for adults  · BMI calculator for children and teens   Using the BMI chart  To figure out your BMI, find your height and weight (or the numbers closest to them) on the table below. Follow each column of numbers to where your height and weight meet on the table. That is your BMI.    Date Last Reviewed: 7/1/2016 © 2000-2016 [employer]. [address] [place] [address]. All rights reserved. This information is not intended as a substitute for professional medical care. Always follow your healthcare professional's instructions.        Controlling Your Cholesterol  Cholesterol is a waxy substance. It travels in your blood through the blood vessels. When you have high cholesterol, it builds up in the walls of the blood vessels. This makes the vessels narrower. Blood flow decreases. You are then at greater risk for having a heart attack or a stroke.  Good and bad cholesterol  Lipids are fats. Blood is mostly water. Fat and water don't mix. So our bodies need lipoproteins (lipids inside a protein shell) to carry the lipids. The protein shell carries its lipids through the bloodstream. There are two main kinds of lipoproteins:  · LDL (low-density lipoprotein) is known as "bad cholesterol." It mainly carries cholesterol. It " "delivers this cholesterol to body cells. Excess LDL cholesterol will build up in artery walls. This increases your risk for heart disease and stroke.  · HDL (high-density lipoprotein) is known as "good cholesterol." This protein shell collects excess cholesterol that LDLs have left behind on blood vessel walls. That's why high levels of HDL cholesterol can decrease your risk of heart disease and stroke.  Controlling cholesterol levels  Total cholesterol includes LDL and HDL cholesterol, as well as other fats in the bloodstream. If your total cholesterol is high, follow the steps below to help lower your total cholesterol level:  · Eat less unhealthy fat:  ¨ Cut back on saturated fats and trans (also called hydrogenated) fats by selecting lean cuts of meat, low-fat dairy, and using oils instead of solid fats. Limit baked goods, processed meats, and fried foods. A diet thats high in these fats increases your bad cholesterol. It's not enough to just cut back on foods containing cholesterol.  ¨ Eat about 2 servings of fish per week. Most fish contain omega-3 fatty acids. These help lower blood cholesterol.  ¨ Eat more whole grains and soluble fiber (such as oat bran). These lower overall cholesterol.  · Be active:  ¨ Choose an activity you enjoy. Walking, swimming, and riding a bike are some good ways to be active.  ¨ Start at a level where you feel comfortable. Increase your time and pace a little each week.  ¨ Work up to 40 minutes of moderate to high intensity physical activity at least 3 to 4 days per week.  ¨ Remember, some activity is better than none.  ¨ If you haven't been exercising regularly, start slowly. Check with your doctor to make sure the exercise plan is right for you.  · Quit smoking. Quitting smoking can improve your lipid levels. It also lowers your risk for heart disease and stroke.  · Weight management. If you are overweight or obese, your health care provider will work with you to lose weight " and lower your BMI (body mass index) to a normal or near-normal level. Making diet changes and increasing physical activity can help.  · Take medication as directed. Many people need medication to get their LDL levels to a safe level. Medication to lower cholesterol levels is effective and safe. (But taking medication is not a substitute for exercise or watching your diet!) Your doctor can tell you whether you might benefit from a cholesterol-lowering medication.  Date Last Reviewed: 5/11/2015 © 2000-2016 [employer]. [address] [place] [address], [place] [address]. All rights reserved. This information is not intended as a substitute for professional medical care. Always follow your healthcare professional's instructions.        Lifestyle Changes to Control Cholesterol  You can control your cholesterol through diet, exercise, weight management, quitting smoking, stress management, and taking your medicines right. These things can also lower your risk for cardiovascular disease.    Eating healthy  Your healthcare provider will give you information on diet changes you may need to make. Your provider may recommend that you see a registered dietitian for help with diet changes. Changes may include:  · Cutting back on the amount of fat and cholesterol in your meals  · Eating less salt (sodium). This is especially important if you have high blood pressure.  · Eating more fresh vegetables and fruits  · Eating lean proteins such as fish, poultry, beans, and peas  · Eating less red meat and processed meats  · Using low-fat dairy products  · Using vegetable and nut oils in limited amounts  · Limiting how many sweets and processed foods like chips, cookies, and baked goods that you eat   · Limiting how many sugar-sweetened beverages you drink  · Limiting how often you eat out  Getting exercise  Regular exercise is a good way to help your body control cholesterol. Regular exercise can help in many ways. It  can:  · Raise your good cholesterol  · Help lower your bad cholesterol  · Let blood flow better through your body  · Give more oxygen to your muscles and tissues  · Help you manage your weight  · Help your heart pump better  · Lower your blood pressure  Your healthcare provider may recommend that you get more physical activity if you haven't been active. Your provider may recommend that you get moderate to vigorous physical activity for at least 40 minutes each day. You should do this for at least 3 to 4 days each week. A few examples of moderate to vigorous activity are:  · Walking at a brisk pace. This is about 3 to 4 miles per hour.  · Jogging or running  · Swimming or water aerobics  · Hiking  · Dancing  · Martial arts  · Tennis  · Riding a bicycle or stationary bike  · Dancing  Managing your weight  If you are overweight or obese, your healthcare provider will work with you to help you lose weight and lower your BMI (body mass index). Making diet changes and getting more physical activity can help. Changing your diet will help you lose weight more easily than adding exercise.  Quitting smoking  Smoking and other tobacco use can raise cholesterol and make it harder to control. Quitting is tough. But millions of people have given up tobacco for good. You can quit, too! Think about some of the reasons below to quit smoking. Do any of them make you think twice about your smoking habit?  Stop smoking because it:  · Keeps your cholesterol high, even if you make all the other changes youre supposed to  · Damages your body. It especially harms your heart, lungs, skin, and blood vessels.  · Makes you more likely to have a heart attack (acute myocardial infarction), stroke, or cancer  · Stains your teeth  · Makes your skin, clothes, and breath smell bad  · Costs a lot of money  Controlling stress   Learn ways to control stress. This will help you deal with stress in your home and work life. Controlling stress can  greatly lower your risk of getting cardiovascular disease.  Making the most of medicines  Healthy eating and exercise are a good start to keeping your cholesterol down. But you may need some extra help from medicine. If your doctor prescribes medicine, be sure to take it exactly as directed. Remember:  · Tell your healthcare provider about all other medicines you take. This includes vitamins and herbs.  · Tell your healthcare provider if you have any side effects after starting to take a medicine. Examples of side effects to watch for include muscle aches, weakness, blurred vision, rust-colored urine, yellowing of eyes or skin (jaundice), and headache.  · Dont skip a dose or stop taking your medicine because you feel better or because your cholesterol numbers go down. Never stop taking your medicine unless your healthcare provider has told you its OK.  · Ask your healthcare provider if you have any questions about your medicines.  High risk groups  Some people may need to take medicines called statins to control their cholesterol. This is in addition to eating a healthy diet and getting regular exercise.  Statins can help you stay healthy. They can also help prevent a heart attack or stroke. You may need to take a statin if you are in one of these groups:  · Adults who have had a heart attack or stroke. Or adults who have had peripheral vascular disease, a ministroke (transient ischemic attack), or stable or unstable angina. This group also includes people who have had a procedure to restore blood flow through a blocked artery. These procedures include percutaneous coronary intervention, angioplasty, stent, and open-heart bypass surgery.  · Adults who have diabetes. Or adults who are at higher risk of having a heart attack or stroke and have an LDL cholesterol level of 70 to 189 mg/dL  · Adults who are 21 years old or older and have an LDL cholesterol level of 190 mg/dL or higher.  If you are in a high-risk group,  talk with your healthcare provider about your treatment goals. Make sure you understand why these goals are important, based on your own health history and your family history of heart disease or high cholesterol.  Make a plan to have regular cholesterol checks. You may need to fast before getting this test. Also ask your provider about any side effects your medicines may cause. Let your provider know about any side effects you have. You may need to take more than one medicine to reach the cholesterol goals that you and your provider decide on.  Date Last Reviewed: 10/1/2016  © 6645-5411 [employer]. [address] [place] [id number]. All rights reserved. This information is not intended as a substitute for professional medical care. Always follow your healthcare professional's instructions.        Eating Heart-Healthy Foods  Eating has a big impact on your heart health. In fact, eating healthier can improve several of your heart risks at once. For instance, it helps you manage weight, cholesterol, and blood pressure. Here are ideas to help you make heart-healthy changes without giving up all the foods and flavors you love.  Getting started  · Talk with your health care provider about eating plans, such as the DASH or Mediterranean diet. You may also be referred to a dietitian.  · Change a few things at a time. Give yourself time to get used to a few eating changes before adding more.  · Work to create a tasty, healthy eating plan that you can stick to for the rest of your life.    Goals for healthy eating  Below are some tips to improve your eating habits:  · Limit saturated fats and trans fats. Saturated fats raise your levels of cholesterol, so keep these fats to a minimum. They are found in foods such as fatty meats, whole milk, cheese, and palm and coconut oils. Avoid trans fats because they lower good cholesterol as well as raise bad cholesterol. Trans fats are most often found in  processed foods.  · Reduce sodium (salt) intake. Eating too much salt may increase your blood pressure. Limit your sodium intake to 2,300 milligrams (mg) per day, or less if your health care provider recommends it. Dining out less often and eating fewer processed foods are two great ways to decrease the amount of salt you consume.  · Managing calories. A calorie is a unit of energy. Your body burns calories for fuel, but if you eat more calories than your body burns, the extras are stored as fat. Your health care provider can help you create a diet plan to manage your calories. This will likely include eating healthier foods as well as exercising regularly. To help you track your progress, keep a diary to record what you eat and how often you exercise.  Choose the right foods  Aim to make these foods staples of your diet. If you have diabetes, you may have different recommendations than what is listed here:  · Fruits and vegetable provide plenty of nutrients without a lot of calories. At meals, fill half your plate with these foods. Split the other half of your plate between whole grains and lean protein.  · Whole grains are high in fiber and rich in vitamins and nutrients. Good choices include whole-wheat bread, pasta, and brown rice.  · Lean proteins give you nutrition with less fat. Good choices include fish, skinless chicken, and beans.  · Low-fat or nonfat dairy provides nutrients without a lot of fat. Try low-fat or nonfat milk, cheese, or yogurt.  · Healthy fats can be good for you in small amounts. These are unsaturated fats, such as olive oil, nuts, and fish. Try to have at least 2 servings per week of fatty fish such as salmon, sardines, mackerel, rainbow trout, and albacore tuna. These contain omega-3 fatty acids, which are good for your heart. Flaxseed is another source of a heart-healthy fat.  More on heart healthy eating    Read food labels  Healthy eating starts at the grocery store. Be sure to pay  attention to food labels on packaged foods. Look for products that are high in fiber and protein, and low in saturated fat, cholesterol, and sodium. Avoid products that contain trans fat. And pay close attention to serving size. For instance, if you plan to eat two servings, double all the numbers on the label.  Prepare food right  A key part of healthy cooking is cutting down on added fat and salt. Look on the internet for lower-fat, lower-sodium recipes. Also, try these tips:  · Remove fat from meat and skin from poultry before cooking.  · Skim fat from the surface of soups and sauces.  · Broil, boil, bake, steam, grill, and microwave food without added fats.  · Choose ingredients that spice up your food without adding calories, fat, or sodium. Try these items: horseradish, hot sauce, lemon, mustard, nonfat salad dressings, and vinegar. For salt-free herbs and spices, try basil, cilantro, cinnamon, pepper, and rosemary.  Date Last Reviewed: 6/25/2015  © 9382-1245 [employer]. [address] [place] [address]. All rights reserved. This information is not intended as a substitute for professional medical care. Always follow your healthcare professional's instructions.        How to Quit Smoking  Smoking is one of the hardest habits to break. About half of all people who have ever smoked have been able to quit. Most people who still smoke want to quit. Here are some of the best ways to stop smoking.    Keep trying  It takes most smokers about eight tries before they can quit entirely. Its important not to give up.  Go cold turkey  Most former smokers quit cold turkey (all at once). Trying to cut back gradually doesn't seem to work as well, perhaps because it continues the smoking habit. Also, it is possible to inhale more while smoking fewer cigarettes. This results in the same amount of nicotine in your body!  Get support  Support programs can be a big help, especially for heavy smokers.  These groups offer lectures, ways to change behavior, and peer support. Here are some ways to find a support program:  · Free national quitline: 800QUIT-NOW (607-354-0798).  · Hospital quit-smoking programs.  · American Lung Association: (753.769.3122).  · American Cancer Society (428-617-1365).  Support at home is important too. Nonsmokers can offer praise and encouragement. If the smoker in your life finds it hard to quit, encourage them to keep trying!  Over-the-counter medicines  Nicotine replacement therapy may make quitting easier. Certain aids, such as the nicotine patch, gum, and lozenges, are available without a prescription. It is best to use these under a doctors care, though. The skin patch provides a steady supply of nicotine. Nicotine gum and lozenges give temporary bursts of low levels of nicotine. Both methods reduce the craving for cigarettes. Warning: If you have nausea, vomiting, dizziness, weakness, or a fast heartbeat, stop using these products and see your doctor.  Prescription medicines  After reviewing your smoking patterns and prior attempts to quit, your doctor may offer a prescription medicine such as bupropion, varenicline, a nicotine inhaler, or nasal spray. Each has advantages and side effects. Your doctor can review these with you.  Health benefits of quitting  The benefits of quitting start right away and keep improving the longer you go without smoking. These benefits occur at any age.  So whether you are 17 or 70, quitting is a good decision. Some of the benefits include:  · 20 minutes: Blood pressure and pulse return to normal.  · 8 hours: Oxygen levels return to normal.  · 2 days: Ability to smell and taste begin to improve as damaged nerves regrow.  · 2 to 3 weeks: Circulation and lung function improve.  · 1 to 9 months: Coughing, congestion, and shortness of breath decrease; tiredness decreases.  · 1 year: Risk of heart attack decreases by half.  · 5 years: Risk of lung cancer  "decreases by half; risk of stroke becomes the same as a nonsmokers.  For more on how to quit smoking, try these online resources:   · Smokefree.gov  · "Clearing the Air" booklet from the [employer] [place]: smokefree.gov/sites/default/files/pdf/clearing-the-air-accessible.pdf  Date Last Reviewed: 4/28/2015  © 8855-0524 [employer]. [address] [place] [address], [place] [address]. All rights reserved. This information is not intended as a substitute for professional medical care. Always follow your healthcare professional's instructions.        "

## 2017-08-22 DIAGNOSIS — Z12.11 COLON CANCER SCREENING: ICD-10-CM

## 2017-09-25 ENCOUNTER — TELEPHONE (OUTPATIENT)
Dept: ADMINISTRATIVE | Facility: HOSPITAL | Age: 57
End: 2017-09-25

## 2017-10-04 ENCOUNTER — PATIENT MESSAGE (OUTPATIENT)
Dept: INTERNAL MEDICINE | Facility: CLINIC | Age: 57
End: 2017-10-04

## 2017-10-05 RX ORDER — AZITHROMYCIN 250 MG/1
TABLET, FILM COATED ORAL
Qty: 6 TABLET | Refills: 2 | Status: SHIPPED | OUTPATIENT
Start: 2017-10-05 | End: 2017-10-10

## 2017-12-27 DIAGNOSIS — I73.00 RAYNAUD'S DISEASE WITHOUT GANGRENE: ICD-10-CM

## 2017-12-28 RX ORDER — AMLODIPINE BESYLATE 5 MG/1
TABLET ORAL
Qty: 180 TABLET | Refills: 1 | Status: SHIPPED | OUTPATIENT
Start: 2017-12-28 | End: 2018-07-19 | Stop reason: SDUPTHER

## 2018-01-25 ENCOUNTER — PES CALL (OUTPATIENT)
Dept: ADMINISTRATIVE | Facility: CLINIC | Age: 58
End: 2018-01-25

## 2018-01-29 DIAGNOSIS — F32.A DEPRESSION, UNSPECIFIED DEPRESSION TYPE: ICD-10-CM

## 2018-01-29 DIAGNOSIS — M32.8 OTHER FORMS OF SYSTEMIC LUPUS ERYTHEMATOSUS, UNSPECIFIED ORGAN INVOLVEMENT STATUS: ICD-10-CM

## 2018-01-29 RX ORDER — PREDNISONE 20 MG/1
20 TABLET ORAL DAILY
Qty: 90 TABLET | Refills: 0 | Status: SHIPPED | OUTPATIENT
Start: 2018-01-29 | End: 2018-07-19 | Stop reason: SDUPTHER

## 2018-01-29 RX ORDER — DULOXETIN HYDROCHLORIDE 30 MG/1
30 CAPSULE, DELAYED RELEASE ORAL DAILY
Qty: 30 CAPSULE | Refills: 5 | Status: SHIPPED | OUTPATIENT
Start: 2018-01-29 | End: 2018-07-19

## 2018-03-31 DIAGNOSIS — E78.5 HYPERLIPIDEMIA, UNSPECIFIED HYPERLIPIDEMIA TYPE: ICD-10-CM

## 2018-04-02 RX ORDER — ROSUVASTATIN CALCIUM 5 MG/1
5 TABLET, COATED ORAL NIGHTLY
Qty: 30 TABLET | Refills: 5 | Status: SHIPPED | OUTPATIENT
Start: 2018-04-02 | End: 2018-07-19 | Stop reason: SDUPTHER

## 2018-04-19 ENCOUNTER — PATIENT MESSAGE (OUTPATIENT)
Dept: INTERNAL MEDICINE | Facility: CLINIC | Age: 58
End: 2018-04-19

## 2018-04-19 DIAGNOSIS — J01.90 ACUTE BACTERIAL SINUSITIS: Primary | ICD-10-CM

## 2018-04-19 DIAGNOSIS — B96.89 ACUTE BACTERIAL SINUSITIS: Primary | ICD-10-CM

## 2018-04-19 DIAGNOSIS — R19.7 DIARRHEA, UNSPECIFIED TYPE: ICD-10-CM

## 2018-04-19 RX ORDER — DIPHENOXYLATE HYDROCHLORIDE AND ATROPINE SULFATE 2.5; .025 MG/1; MG/1
1 TABLET ORAL 4 TIMES DAILY PRN
Qty: 30 TABLET | Refills: 5 | Status: SHIPPED | OUTPATIENT
Start: 2018-04-19 | End: 2018-11-19 | Stop reason: SDUPTHER

## 2018-04-19 RX ORDER — AZITHROMYCIN 250 MG/1
TABLET, FILM COATED ORAL
Qty: 6 TABLET | Refills: 1 | Status: SHIPPED | OUTPATIENT
Start: 2018-04-19 | End: 2018-04-24

## 2018-04-26 ENCOUNTER — PES CALL (OUTPATIENT)
Dept: ADMINISTRATIVE | Facility: CLINIC | Age: 58
End: 2018-04-26

## 2018-07-05 ENCOUNTER — PATIENT MESSAGE (OUTPATIENT)
Dept: INTERNAL MEDICINE | Facility: CLINIC | Age: 58
End: 2018-07-05

## 2018-07-06 ENCOUNTER — PATIENT MESSAGE (OUTPATIENT)
Dept: INTERNAL MEDICINE | Facility: CLINIC | Age: 58
End: 2018-07-06

## 2018-07-06 DIAGNOSIS — I10 BENIGN ESSENTIAL HTN: Primary | ICD-10-CM

## 2018-07-06 DIAGNOSIS — E78.5 HYPERLIPIDEMIA LDL GOAL <70: ICD-10-CM

## 2018-07-10 LAB
ALBUMIN SERPL-MCNC: 4.5 G/DL (ref 3.5–5.5)
ALBUMIN/CREAT UR: 16 MG/G CREAT (ref 0–30)
ALBUMIN/GLOB SERPL: 1.9 {RATIO} (ref 1.2–2.2)
ALP SERPL-CCNC: 122 IU/L (ref 39–117)
ALT SERPL-CCNC: 25 IU/L (ref 0–32)
AMBIG ABBREV LP DEFAULT: NORMAL
AST SERPL-CCNC: 21 IU/L (ref 0–40)
BASOPHILS # BLD AUTO: 0 X10E3/UL (ref 0–0.2)
BASOPHILS NFR BLD AUTO: 0 %
BILIRUB SERPL-MCNC: <0.2 MG/DL (ref 0–1.2)
BUN SERPL-MCNC: 10 MG/DL (ref 6–24)
BUN/CREAT SERPL: 14 (ref 9–23)
CALCIUM SERPL-MCNC: 9.9 MG/DL (ref 8.7–10.2)
CHLORIDE SERPL-SCNC: 107 MMOL/L (ref 96–106)
CHOLEST SERPL-MCNC: 166 MG/DL (ref 100–199)
CO2 SERPL-SCNC: 24 MMOL/L (ref 20–29)
CREAT SERPL-MCNC: 0.69 MG/DL (ref 0.57–1)
CREAT UR-MCNC: 151.2 MG/DL
EGFR IF AFRICAN AMERICAN: 110 ML/MIN/1.73
EOSINOPHIL # BLD AUTO: 0.2 X10E3/UL (ref 0–0.4)
EOSINOPHIL NFR BLD AUTO: 2 %
ERYTHROCYTE [DISTWIDTH] IN BLOOD BY AUTOMATED COUNT: 14.2 % (ref 12.3–15.4)
EST. GFR  (NON AFRICAN AMERICAN): 96 ML/MIN/1.73
GLOBULIN SER CALC-MCNC: 2.4 G/DL (ref 1.5–4.5)
GLUCOSE SERPL-MCNC: 103 MG/DL (ref 65–99)
HCT VFR BLD AUTO: 45.2 % (ref 34–46.6)
HDLC SERPL-MCNC: 44 MG/DL
HGB BLD-MCNC: 14.4 G/DL (ref 11.1–15.9)
IMM GRANULOCYTES # BLD: 0 X10E3/UL (ref 0–0.1)
IMM GRANULOCYTES NFR BLD: 0 %
LDLC SERPL CALC-MCNC: 90 MG/DL (ref 0–99)
LYMPHOCYTES # BLD AUTO: 4.6 X10E3/UL (ref 0.7–3.1)
LYMPHOCYTES NFR BLD AUTO: 43 %
MCH RBC QN AUTO: 26.7 PG (ref 26.6–33)
MCHC RBC AUTO-ENTMCNC: 31.9 G/DL (ref 31.5–35.7)
MCV RBC AUTO: 84 FL (ref 79–97)
MICROALBUMIN UR-MCNC: 24.2 UG/ML
MONOCYTES # BLD AUTO: 0.9 X10E3/UL (ref 0.1–0.9)
MONOCYTES NFR BLD AUTO: 8 %
NEUTROPHILS # BLD AUTO: 5 X10E3/UL (ref 1.4–7)
NEUTROPHILS NFR BLD AUTO: 47 %
PLATELET # BLD AUTO: 371 X10E3/UL (ref 150–379)
POTASSIUM SERPL-SCNC: 4.7 MMOL/L (ref 3.5–5.2)
PROT SERPL-MCNC: 6.9 G/DL (ref 6–8.5)
RBC # BLD AUTO: 5.39 X10E6/UL (ref 3.77–5.28)
SODIUM SERPL-SCNC: 145 MMOL/L (ref 134–144)
TRIGL SERPL-MCNC: 162 MG/DL (ref 0–149)
TSH SERPL DL<=0.005 MIU/L-ACNC: 2.48 UIU/ML (ref 0.45–4.5)
VLDLC SERPL CALC-MCNC: 32 MG/DL (ref 5–40)
WBC # BLD AUTO: 10.8 X10E3/UL (ref 3.4–10.8)

## 2018-07-19 ENCOUNTER — OFFICE VISIT (OUTPATIENT)
Dept: INTERNAL MEDICINE | Facility: CLINIC | Age: 58
End: 2018-07-19
Payer: MEDICARE

## 2018-07-19 VITALS
DIASTOLIC BLOOD PRESSURE: 80 MMHG | BODY MASS INDEX: 44.08 KG/M2 | HEART RATE: 88 BPM | RESPIRATION RATE: 18 BRPM | WEIGHT: 210 LBS | SYSTOLIC BLOOD PRESSURE: 116 MMHG | OXYGEN SATURATION: 96 % | HEIGHT: 58 IN

## 2018-07-19 DIAGNOSIS — E66.01 MORBID OBESITY WITH BMI OF 40.0-44.9, ADULT: ICD-10-CM

## 2018-07-19 DIAGNOSIS — Z72.0 TOBACCO ABUSE: ICD-10-CM

## 2018-07-19 DIAGNOSIS — E78.5 HYPERLIPIDEMIA LDL GOAL <70: ICD-10-CM

## 2018-07-19 DIAGNOSIS — F11.20 UNCOMPLICATED OPIOID DEPENDENCE: ICD-10-CM

## 2018-07-19 DIAGNOSIS — F32.A DEPRESSION, UNSPECIFIED DEPRESSION TYPE: ICD-10-CM

## 2018-07-19 DIAGNOSIS — M32.8 OTHER FORMS OF SYSTEMIC LUPUS ERYTHEMATOSUS, UNSPECIFIED ORGAN INVOLVEMENT STATUS: Primary | ICD-10-CM

## 2018-07-19 DIAGNOSIS — Z87.09 HISTORY OF ACUTE BACTERIAL SINUSITIS: ICD-10-CM

## 2018-07-19 DIAGNOSIS — I10 BENIGN ESSENTIAL HTN: ICD-10-CM

## 2018-07-19 DIAGNOSIS — Z71.6 TOBACCO ABUSE COUNSELING: ICD-10-CM

## 2018-07-19 DIAGNOSIS — I73.00 RAYNAUD'S DISEASE WITHOUT GANGRENE: ICD-10-CM

## 2018-07-19 PROCEDURE — 3079F DIAST BP 80-89 MM HG: CPT | Mod: CPTII,S$GLB,, | Performed by: NURSE PRACTITIONER

## 2018-07-19 PROCEDURE — 99499 UNLISTED E&M SERVICE: CPT | Mod: S$GLB,,, | Performed by: NURSE PRACTITIONER

## 2018-07-19 PROCEDURE — 99214 OFFICE O/P EST MOD 30 MIN: CPT | Mod: S$GLB,,, | Performed by: NURSE PRACTITIONER

## 2018-07-19 PROCEDURE — 3008F BODY MASS INDEX DOCD: CPT | Mod: CPTII,S$GLB,, | Performed by: NURSE PRACTITIONER

## 2018-07-19 PROCEDURE — 99999 PR PBB SHADOW E&M-EST. PATIENT-LVL IV: CPT | Mod: PBBFAC,,, | Performed by: NURSE PRACTITIONER

## 2018-07-19 PROCEDURE — 3074F SYST BP LT 130 MM HG: CPT | Mod: CPTII,S$GLB,, | Performed by: NURSE PRACTITIONER

## 2018-07-19 RX ORDER — AZITHROMYCIN 250 MG/1
TABLET, FILM COATED ORAL
Qty: 6 TABLET | Refills: 2 | Status: SHIPPED | OUTPATIENT
Start: 2018-07-19 | End: 2018-07-24

## 2018-07-19 RX ORDER — PREDNISONE 20 MG/1
20 TABLET ORAL DAILY
Qty: 90 TABLET | Refills: 0 | Status: SHIPPED | OUTPATIENT
Start: 2018-07-19 | End: 2019-11-26

## 2018-07-19 RX ORDER — AMLODIPINE BESYLATE 5 MG/1
5 TABLET ORAL 2 TIMES DAILY
Qty: 180 TABLET | Refills: 1 | Status: SHIPPED | OUTPATIENT
Start: 2018-07-19 | End: 2019-01-30 | Stop reason: SDUPTHER

## 2018-07-19 RX ORDER — ROSUVASTATIN CALCIUM 5 MG/1
5 TABLET, COATED ORAL NIGHTLY
Qty: 30 TABLET | Refills: 5 | Status: SHIPPED | OUTPATIENT
Start: 2018-07-19 | End: 2019-05-09 | Stop reason: SDUPTHER

## 2018-07-19 NOTE — PATIENT INSTRUCTIONS

## 2018-07-19 NOTE — PROGRESS NOTES
Subjective:       Patient ID: Lena Zamarripa is a 58 y.o. female.    Chief Complaint: Follow-up (check up with results)    Patient is known, to me and presents with   Chief Complaint   Patient presents with    Follow-up     check up with results   .  Denies chest pain and shortness of breath.  Patient presents with check up and results. She is up to date with screenings.  She is doing ok at this time.. No longer on antidepressant but is stable with that. Needs to have refills  HPI  Review of Systems   Constitutional: Positive for activity change and unexpected weight change. Negative for appetite change, fatigue and fever.   HENT: Negative for congestion, ear discharge, ear pain, hearing loss, postnasal drip, rhinorrhea, tinnitus and trouble swallowing.    Eyes: Negative for photophobia, pain, discharge and visual disturbance.   Respiratory: Positive for wheezing. Negative for cough, chest tightness, shortness of breath and stridor.    Cardiovascular: Negative for chest pain, palpitations and leg swelling.   Gastrointestinal: Positive for diarrhea. Negative for abdominal distention, blood in stool, constipation and vomiting.   Endocrine: Positive for polyuria. Negative for polydipsia.   Genitourinary: Negative for difficulty urinating, dysuria, frequency, hematuria, menstrual problem and urgency.   Musculoskeletal: Positive for arthralgias, joint swelling and neck pain. Negative for back pain and gait problem.   Skin: Negative.    Neurological: Positive for weakness. Negative for dizziness, seizures, syncope, light-headedness, numbness and headaches.   Hematological: Negative for adenopathy. Does not bruise/bleed easily.   Psychiatric/Behavioral: Negative for behavioral problems, confusion, dysphoric mood, hallucinations, sleep disturbance and suicidal ideas. The patient is not nervous/anxious.        Objective:      Physical Exam   Constitutional: She is oriented to person, place, and time. She appears  well-developed and well-nourished. No distress.   HENT:   Head: Normocephalic and atraumatic.   Right Ear: External ear normal.   Left Ear: External ear normal.   Mouth/Throat: Oropharynx is clear and moist. No oropharyngeal exudate.   Eyes: Conjunctivae and EOM are normal. Pupils are equal, round, and reactive to light. Right eye exhibits no discharge. Left eye exhibits no discharge.   Neck: Normal range of motion. Neck supple. No JVD present. No thyromegaly present.   Cardiovascular: Normal rate, regular rhythm, normal heart sounds and intact distal pulses.  Exam reveals no gallop and no friction rub.    No murmur heard.  Pulmonary/Chest: Effort normal and breath sounds normal. No stridor. No respiratory distress. She has no wheezes. She has no rales. She exhibits no tenderness.   Abdominal: Soft. Bowel sounds are normal. She exhibits no distension and no mass. There is no tenderness. There is no rebound.   Musculoskeletal: She exhibits no edema or tenderness.   Lymphadenopathy:     She has no cervical adenopathy.   Neurological: She is alert and oriented to person, place, and time. She has normal reflexes. She displays normal reflexes. No cranial nerve deficit. She exhibits normal muscle tone. Coordination normal.   Skin: Skin is warm and dry. Capillary refill takes less than 2 seconds. No rash noted. No erythema. No pallor.   Discoids to UE and back region   Psychiatric: She has a normal mood and affect. Her behavior is normal. Judgment and thought content normal.   Negative SI/HI       Assessment:       1. Other forms of systemic lupus erythematosus, unspecified organ involvement status    2. Benign essential HTN    3. Hyperlipidemia LDL goal <70    4. Uncomplicated opioid dependence    5. Morbid obesity with BMI of 40.0-44.9, adult    6. Depression, unspecified depression type    7. Raynaud's disease without gangrene    8. Tobacco abuse    9. Tobacco abuse counseling    10. History of acute bacterial sinusitis         Plan:   Lena was seen today for follow-up.    Diagnoses and all orders for this visit:    Other forms of systemic lupus erythematosus, unspecified organ involvement status  -     CBC auto differential; Future  -     Comprehensive metabolic panel; Future  -     predniSONE (DELTASONE) 20 MG tablet; Take 1 tablet (20 mg total) by mouth once daily.  -     CBC auto differential  -     Comprehensive metabolic panel  Stable   Benign essential HTN  -     CBC auto differential; Future  -     Comprehensive metabolic panel; Future  -     Microalbumin/creatinine urine ratio; Future  -     amLODIPine (NORVASC) 5 MG tablet; Take 1 tablet (5 mg total) by mouth 2 (two) times daily.  -     CBC auto differential  -     Comprehensive metabolic panel  -     Microalbumin/creatinine urine ratio    Hyperlipidemia LDL goal <70  -     CBC auto differential; Future  -     Comprehensive metabolic panel; Future  -     TSH; Future  -     Lipid panel; Future  -     rosuvastatin (CRESTOR) 5 MG tablet; Take 1 tablet (5 mg total) by mouth every evening.  -     CBC auto differential  -     Comprehensive metabolic panel  -     TSH  -     Lipid panel    Uncomplicated opioid dependence  -     CBC auto differential; Future  -     Comprehensive metabolic panel; Future  -     CBC auto differential  -     Comprehensive metabolic panel  Stable and sees pain management     Morbid obesity with BMI of 40.0-44.9, adult  -     CBC auto differential; Future  -     Comprehensive metabolic panel; Future  -     CBC auto differential  -     Comprehensive metabolic panel  stable    Depression, unspecified depression type  -     CBC auto differential; Future  -     Comprehensive metabolic panel; Future  -     CBC auto differential  -     Comprehensive metabolic panel    Raynaud's disease without gangrene  -     CBC auto differential; Future  -     Comprehensive metabolic panel; Future  -     amLODIPine (NORVASC) 5 MG tablet; Take 1 tablet (5 mg total) by mouth  "2 (two) times daily.  -     CBC auto differential  -     Comprehensive metabolic panel    Tobacco abuse  -     CBC auto differential; Future  -     Comprehensive metabolic panel; Future  -     CBC auto differential  -     Comprehensive metabolic panel    Tobacco abuse counseling    History of acute bacterial sinusitis  -     azithromycin (Z-KASSANDRA) 250 MG tablet; Take 2 tablets by mouth on day 1; Take 1 tablet by mouth on days 2-5    "This note will not be shared with the patient."  Lab drawn today CBC, CMP, TSH, FLP  Limit the cholesterol in your diet to less than 300 mg per day.  Fats should contribute no more than 20 to 35% of your daily calories.  Less than 7 to 10% of your calories should come from saturated fat.  Avoid saturated fat products e.g., butter, some oils, meat, and poultry fat contain a lot of saturated fat.  Check food labels for fat and cholesterol content. Choose the foods with less fat per serving.  Limit the amount of butter and margarine you eat.  Use salad dressings and margarine made with polyunsaturated and monunsaturated fats.  Use egg whites or egg substitutes rather than whole eggs.  Replace whole-milk dairy products with nonfat or low-fat mild, cheese, spreads, and yogurt.  Eat skinless chicken, turkey, fish, and meatless entrees more often than red meat.  Choose lean cuts of meat and trim off all visible fat. Keep portion sizes moderate.  Avoid fatty desserts such as ice cream, cream-filled cakes, and cheesecakes. Choose fresh fruits, nonfat frozen yogurt, Popsicles, etc.  Reduce the amount of fried foods, vending machine food, and fast food you eat.  Eat fruits and vegetables (especially fresh fruits and leafy vegetables), beans, and whole grains daily. The fiber in these foods helps lower cholesterol.  Look for low-fat or nonfat varieties of the foods you like to eat or look for substitutes.  You may need to exercise 60 minutes a day to prevent weight gain and 90 minutes a day to lose " weight.  RTC in one year

## 2018-07-20 DIAGNOSIS — Z71.6 TOBACCO ABUSE COUNSELING: ICD-10-CM

## 2018-07-20 DIAGNOSIS — Z72.0 TOBACCO ABUSE: ICD-10-CM

## 2018-07-20 RX ORDER — IBUPROFEN 200 MG
TABLET ORAL
Qty: 30 PATCH | Refills: 2 | Status: SHIPPED | OUTPATIENT
Start: 2018-07-20 | End: 2019-11-26

## 2018-07-26 ENCOUNTER — PES CALL (OUTPATIENT)
Dept: ADMINISTRATIVE | Facility: CLINIC | Age: 58
End: 2018-07-26

## 2018-08-14 ENCOUNTER — PATIENT MESSAGE (OUTPATIENT)
Dept: INTERNAL MEDICINE | Facility: CLINIC | Age: 58
End: 2018-08-14

## 2018-08-14 RX ORDER — MOXIFLOXACIN 5 MG/ML
1 SOLUTION/ DROPS OPHTHALMIC 3 TIMES DAILY
Qty: 3 ML | Refills: 0 | Status: SHIPPED | OUTPATIENT
Start: 2018-08-14 | End: 2019-11-26

## 2018-09-07 DIAGNOSIS — Z12.11 COLON CANCER SCREENING: ICD-10-CM

## 2018-10-05 DIAGNOSIS — Z12.39 BREAST CANCER SCREENING: ICD-10-CM

## 2018-10-11 ENCOUNTER — PATIENT MESSAGE (OUTPATIENT)
Dept: INTERNAL MEDICINE | Facility: CLINIC | Age: 58
End: 2018-10-11

## 2018-10-11 DIAGNOSIS — B37.2 CANDIDAL INTERTRIGO: Primary | ICD-10-CM

## 2018-10-11 RX ORDER — CLOTRIMAZOLE AND BETAMETHASONE DIPROPIONATE 10; .64 MG/G; MG/G
CREAM TOPICAL 2 TIMES DAILY
Qty: 45 G | Refills: 1 | Status: SHIPPED | OUTPATIENT
Start: 2018-10-11 | End: 2023-06-06

## 2018-10-11 RX ORDER — NYSTATIN 100000 [USP'U]/G
POWDER TOPICAL 2 TIMES DAILY
Qty: 60 G | Refills: 1 | Status: SHIPPED | OUTPATIENT
Start: 2018-10-11

## 2018-10-22 ENCOUNTER — PATIENT MESSAGE (OUTPATIENT)
Dept: INTERNAL MEDICINE | Facility: CLINIC | Age: 58
End: 2018-10-22

## 2018-10-22 DIAGNOSIS — E66.9 OBESITY (BMI 30-39.9): Primary | ICD-10-CM

## 2018-10-24 ENCOUNTER — PATIENT MESSAGE (OUTPATIENT)
Dept: INTERNAL MEDICINE | Facility: CLINIC | Age: 58
End: 2018-10-24

## 2018-11-19 DIAGNOSIS — R19.7 DIARRHEA, UNSPECIFIED TYPE: ICD-10-CM

## 2018-11-19 RX ORDER — DIPHENOXYLATE HYDROCHLORIDE AND ATROPINE SULFATE 2.5; .025 MG/1; MG/1
TABLET ORAL
Qty: 30 TABLET | Refills: 5 | Status: SHIPPED | OUTPATIENT
Start: 2018-11-19 | End: 2019-11-26

## 2018-12-03 ENCOUNTER — PATIENT MESSAGE (OUTPATIENT)
Dept: INTERNAL MEDICINE | Facility: CLINIC | Age: 58
End: 2018-12-03

## 2018-12-03 RX ORDER — ALBUTEROL SULFATE 0.83 MG/ML
2.5 SOLUTION RESPIRATORY (INHALATION) EVERY 6 HOURS PRN
Qty: 90 ML | Refills: 5 | Status: SHIPPED | OUTPATIENT
Start: 2018-12-03 | End: 2023-10-10

## 2018-12-31 ENCOUNTER — PATIENT MESSAGE (OUTPATIENT)
Dept: INTERNAL MEDICINE | Facility: CLINIC | Age: 58
End: 2018-12-31

## 2018-12-31 RX ORDER — AZITHROMYCIN 250 MG/1
TABLET, FILM COATED ORAL
Qty: 6 TABLET | Refills: 0 | Status: SHIPPED | OUTPATIENT
Start: 2018-12-31 | End: 2019-01-05

## 2018-12-31 RX ORDER — ALBUTEROL SULFATE 90 UG/1
2 AEROSOL, METERED RESPIRATORY (INHALATION) EVERY 6 HOURS PRN
Qty: 18 G | Refills: 1 | Status: SHIPPED | OUTPATIENT
Start: 2018-12-31 | End: 2019-11-26

## 2019-01-30 DIAGNOSIS — I10 BENIGN ESSENTIAL HTN: ICD-10-CM

## 2019-01-30 DIAGNOSIS — I73.00 RAYNAUD'S DISEASE WITHOUT GANGRENE: ICD-10-CM

## 2019-01-31 RX ORDER — AMLODIPINE BESYLATE 5 MG/1
TABLET ORAL
Qty: 180 TABLET | Refills: 1 | Status: SHIPPED | OUTPATIENT
Start: 2019-01-31 | End: 2019-06-09 | Stop reason: SDUPTHER

## 2019-02-25 DIAGNOSIS — M32.8 OTHER FORMS OF SYSTEMIC LUPUS ERYTHEMATOSUS, UNSPECIFIED ORGAN INVOLVEMENT STATUS: ICD-10-CM

## 2019-02-26 RX ORDER — PREDNISONE 20 MG/1
TABLET ORAL
Qty: 90 TABLET | Refills: 0 | Status: SHIPPED | OUTPATIENT
Start: 2019-02-26 | End: 2019-11-26 | Stop reason: SDUPTHER

## 2019-04-12 ENCOUNTER — HOSPITAL ENCOUNTER (OUTPATIENT)
Dept: RADIOLOGY | Facility: HOSPITAL | Age: 59
Discharge: HOME | End: 2019-04-12
Attending: NURSE PRACTITIONER
Payer: MEDICARE

## 2019-04-12 VITALS — BODY MASS INDEX: 44.08 KG/M2 | WEIGHT: 210 LBS | HEIGHT: 58 IN

## 2019-04-12 DIAGNOSIS — Z12.39 BREAST CANCER SCREENING: ICD-10-CM

## 2019-04-12 PROCEDURE — 77067 SCR MAMMO BI INCL CAD: CPT | Mod: TC,HCNC

## 2019-05-01 ENCOUNTER — PATIENT MESSAGE (OUTPATIENT)
Dept: INTERNAL MEDICINE | Facility: CLINIC | Age: 59
End: 2019-05-01

## 2019-05-01 DIAGNOSIS — J01.90 ACUTE BACTERIAL SINUSITIS: Primary | ICD-10-CM

## 2019-05-01 DIAGNOSIS — B96.89 ACUTE BACTERIAL SINUSITIS: Primary | ICD-10-CM

## 2019-05-01 RX ORDER — AZITHROMYCIN 250 MG/1
TABLET, FILM COATED ORAL
Qty: 6 TABLET | Refills: 0 | Status: SHIPPED | OUTPATIENT
Start: 2019-05-01 | End: 2021-04-05 | Stop reason: SDUPTHER

## 2019-05-08 ENCOUNTER — PATIENT MESSAGE (OUTPATIENT)
Dept: INTERNAL MEDICINE | Facility: CLINIC | Age: 59
End: 2019-05-08

## 2019-05-08 DIAGNOSIS — E78.5 HYPERLIPIDEMIA LDL GOAL <70: ICD-10-CM

## 2019-05-08 DIAGNOSIS — E78.5 HYPERLIPIDEMIA, UNSPECIFIED HYPERLIPIDEMIA TYPE: ICD-10-CM

## 2019-05-09 RX ORDER — ROSUVASTATIN CALCIUM 5 MG/1
5 TABLET, COATED ORAL NIGHTLY
Qty: 30 TABLET | Refills: 5 | Status: SHIPPED | OUTPATIENT
Start: 2019-05-09 | End: 2019-06-08

## 2019-05-09 RX ORDER — VALACYCLOVIR HYDROCHLORIDE 1 G/1
1000 TABLET, FILM COATED ORAL 3 TIMES DAILY
Qty: 21 TABLET | Refills: 0 | Status: SHIPPED | OUTPATIENT
Start: 2019-05-09 | End: 2023-06-06

## 2019-05-24 ENCOUNTER — PES CALL (OUTPATIENT)
Dept: ADMINISTRATIVE | Facility: CLINIC | Age: 59
End: 2019-05-24

## 2019-06-06 DIAGNOSIS — I73.00 RAYNAUD'S DISEASE WITHOUT GANGRENE: ICD-10-CM

## 2019-06-06 DIAGNOSIS — I10 BENIGN ESSENTIAL HTN: ICD-10-CM

## 2019-06-09 RX ORDER — AMLODIPINE BESYLATE 5 MG/1
TABLET ORAL
Qty: 180 TABLET | Refills: 1 | Status: SHIPPED | OUTPATIENT
Start: 2019-06-09 | End: 2020-03-02

## 2019-08-06 ENCOUNTER — PATIENT MESSAGE (OUTPATIENT)
Dept: INTERNAL MEDICINE | Facility: CLINIC | Age: 59
End: 2019-08-06

## 2019-08-06 DIAGNOSIS — E78.5 HYPERLIPIDEMIA LDL GOAL <70: ICD-10-CM

## 2019-08-06 DIAGNOSIS — L93.0 LUPUS ERYTHEMATOSUS, UNSPECIFIED FORM: ICD-10-CM

## 2019-08-06 DIAGNOSIS — I10 BENIGN ESSENTIAL HTN: Primary | ICD-10-CM

## 2019-08-10 LAB
25(OH)D3+25(OH)D2 SERPL-MCNC: 27.7 NG/ML (ref 30–100)
ALBUMIN SERPL-MCNC: 4.5 G/DL (ref 3.6–4.8)
ALBUMIN/CREAT UR: 5.3 MG/G CREAT (ref 0–30)
ALBUMIN/GLOB SERPL: 1.9 {RATIO} (ref 1.2–2.2)
ALP SERPL-CCNC: 344 IU/L (ref 39–117)
ALT SERPL-CCNC: 93 IU/L (ref 0–32)
AST SERPL-CCNC: 43 IU/L (ref 0–40)
BASOPHILS # BLD AUTO: 0 X10E3/UL (ref 0–0.2)
BASOPHILS NFR BLD AUTO: 0 %
BILIRUB SERPL-MCNC: <0.2 MG/DL (ref 0–1.2)
BUN SERPL-MCNC: 8 MG/DL (ref 8–27)
BUN/CREAT SERPL: 11 (ref 12–28)
CALCIUM SERPL-MCNC: 9.6 MG/DL (ref 8.7–10.3)
CHLORIDE SERPL-SCNC: 107 MMOL/L (ref 96–106)
CHOLEST SERPL-MCNC: 140 MG/DL (ref 100–199)
CO2 SERPL-SCNC: 25 MMOL/L (ref 20–29)
CREAT SERPL-MCNC: 0.74 MG/DL (ref 0.57–1)
CREAT UR-MCNC: 99.5 MG/DL
EOSINOPHIL # BLD AUTO: 0.2 X10E3/UL (ref 0–0.4)
EOSINOPHIL NFR BLD AUTO: 1 %
ERYTHROCYTE [DISTWIDTH] IN BLOOD BY AUTOMATED COUNT: 14.3 % (ref 12.3–15.4)
GLOBULIN SER CALC-MCNC: 2.4 G/DL (ref 1.5–4.5)
GLUCOSE SERPL-MCNC: 135 MG/DL (ref 65–99)
HCT VFR BLD AUTO: 47.4 % (ref 34–46.6)
HDLC SERPL-MCNC: 45 MG/DL
HGB BLD-MCNC: 14.9 G/DL (ref 11.1–15.9)
IMM GRANULOCYTES # BLD AUTO: 0 X10E3/UL (ref 0–0.1)
IMM GRANULOCYTES NFR BLD AUTO: 0 %
LDLC SERPL CALC-MCNC: 63 MG/DL (ref 0–99)
LYMPHOCYTES # BLD AUTO: 3 X10E3/UL (ref 0.7–3.1)
LYMPHOCYTES NFR BLD AUTO: 24 %
MCH RBC QN AUTO: 26.6 PG (ref 26.6–33)
MCHC RBC AUTO-ENTMCNC: 31.4 G/DL (ref 31.5–35.7)
MCV RBC AUTO: 85 FL (ref 79–97)
MICROALBUMIN UR-MCNC: 5.3 UG/ML
MONOCYTES # BLD AUTO: 0.7 X10E3/UL (ref 0.1–0.9)
MONOCYTES NFR BLD AUTO: 6 %
NEUTROPHILS # BLD AUTO: 8.5 X10E3/UL (ref 1.4–7)
NEUTROPHILS NFR BLD AUTO: 69 %
PLATELET # BLD AUTO: 382 X10E3/UL (ref 150–450)
POTASSIUM SERPL-SCNC: 5.2 MMOL/L (ref 3.5–5.2)
PROT SERPL-MCNC: 6.9 G/DL (ref 6–8.5)
RBC # BLD AUTO: 5.61 X10E6/UL (ref 3.77–5.28)
SODIUM SERPL-SCNC: 149 MMOL/L (ref 134–144)
SPECIMEN STATUS REPORT: NORMAL
TRIGL SERPL-MCNC: 162 MG/DL (ref 0–149)
TSH SERPL DL<=0.005 MIU/L-ACNC: 1.47 UIU/ML (ref 0.45–4.5)
VLDLC SERPL CALC-MCNC: 32 MG/DL (ref 5–40)
WBC # BLD AUTO: 12.4 X10E3/UL (ref 3.4–10.8)

## 2019-08-14 ENCOUNTER — PATIENT MESSAGE (OUTPATIENT)
Dept: INTERNAL MEDICINE | Facility: CLINIC | Age: 59
End: 2019-08-14

## 2019-09-04 ENCOUNTER — PATIENT MESSAGE (OUTPATIENT)
Dept: INTERNAL MEDICINE | Facility: CLINIC | Age: 59
End: 2019-09-04

## 2019-11-26 ENCOUNTER — OFFICE VISIT (OUTPATIENT)
Dept: INTERNAL MEDICINE | Facility: CLINIC | Age: 59
End: 2019-11-26
Payer: MEDICARE

## 2019-11-26 VITALS
SYSTOLIC BLOOD PRESSURE: 126 MMHG | WEIGHT: 211 LBS | DIASTOLIC BLOOD PRESSURE: 86 MMHG | RESPIRATION RATE: 18 BRPM | HEART RATE: 79 BPM | BODY MASS INDEX: 44.29 KG/M2 | HEIGHT: 58 IN | OXYGEN SATURATION: 96 %

## 2019-11-26 DIAGNOSIS — B96.89 ACUTE BACTERIAL SINUSITIS: ICD-10-CM

## 2019-11-26 DIAGNOSIS — F11.20 UNCOMPLICATED OPIOID DEPENDENCE: ICD-10-CM

## 2019-11-26 DIAGNOSIS — M32.8 OTHER FORMS OF SYSTEMIC LUPUS ERYTHEMATOSUS, UNSPECIFIED ORGAN INVOLVEMENT STATUS: ICD-10-CM

## 2019-11-26 DIAGNOSIS — E66.01 MORBID OBESITY WITH BMI OF 40.0-44.9, ADULT: ICD-10-CM

## 2019-11-26 DIAGNOSIS — E78.5 HYPERLIPIDEMIA LDL GOAL <70: Primary | ICD-10-CM

## 2019-11-26 DIAGNOSIS — J01.90 ACUTE BACTERIAL SINUSITIS: ICD-10-CM

## 2019-11-26 PROCEDURE — 3079F PR MOST RECENT DIASTOLIC BLOOD PRESSURE 80-89 MM HG: ICD-10-PCS | Mod: HCNC,CPTII,S$GLB, | Performed by: NURSE PRACTITIONER

## 2019-11-26 PROCEDURE — 3008F PR BODY MASS INDEX (BMI) DOCUMENTED: ICD-10-PCS | Mod: HCNC,CPTII,S$GLB, | Performed by: NURSE PRACTITIONER

## 2019-11-26 PROCEDURE — 99999 PR PBB SHADOW E&M-EST. PATIENT-LVL III: CPT | Mod: PBBFAC,HCNC,, | Performed by: NURSE PRACTITIONER

## 2019-11-26 PROCEDURE — 3074F PR MOST RECENT SYSTOLIC BLOOD PRESSURE < 130 MM HG: ICD-10-PCS | Mod: HCNC,CPTII,S$GLB, | Performed by: NURSE PRACTITIONER

## 2019-11-26 PROCEDURE — 3074F SYST BP LT 130 MM HG: CPT | Mod: HCNC,CPTII,S$GLB, | Performed by: NURSE PRACTITIONER

## 2019-11-26 PROCEDURE — 99999 PR PBB SHADOW E&M-EST. PATIENT-LVL III: ICD-10-PCS | Mod: PBBFAC,HCNC,, | Performed by: NURSE PRACTITIONER

## 2019-11-26 PROCEDURE — 3079F DIAST BP 80-89 MM HG: CPT | Mod: HCNC,CPTII,S$GLB, | Performed by: NURSE PRACTITIONER

## 2019-11-26 PROCEDURE — 99214 PR OFFICE/OUTPT VISIT, EST, LEVL IV, 30-39 MIN: ICD-10-PCS | Mod: HCNC,S$GLB,, | Performed by: NURSE PRACTITIONER

## 2019-11-26 PROCEDURE — 99499 RISK ADDL DX/OHS AUDIT: ICD-10-PCS | Mod: S$GLB,,, | Performed by: NURSE PRACTITIONER

## 2019-11-26 PROCEDURE — 3008F BODY MASS INDEX DOCD: CPT | Mod: HCNC,CPTII,S$GLB, | Performed by: NURSE PRACTITIONER

## 2019-11-26 PROCEDURE — 99214 OFFICE O/P EST MOD 30 MIN: CPT | Mod: HCNC,S$GLB,, | Performed by: NURSE PRACTITIONER

## 2019-11-26 PROCEDURE — 99499 UNLISTED E&M SERVICE: CPT | Mod: S$GLB,,, | Performed by: NURSE PRACTITIONER

## 2019-11-26 RX ORDER — PREDNISONE 20 MG/1
20 TABLET ORAL DAILY
Qty: 90 TABLET | Refills: 1 | Status: SHIPPED | OUTPATIENT
Start: 2019-11-26 | End: 2020-12-16 | Stop reason: SDUPTHER

## 2019-11-26 RX ORDER — GABAPENTIN 400 MG/1
400 CAPSULE ORAL 2 TIMES DAILY
Refills: 0 | COMMUNITY
Start: 2019-10-30 | End: 2023-06-06

## 2019-11-26 RX ORDER — BUPRENORPHINE HYDROCHLORIDE 450 UG/1
FILM, SOLUBLE BUCCAL
Refills: 0 | COMMUNITY
Start: 2019-10-30

## 2019-11-26 RX ORDER — FAMOTIDINE 20 MG/1
TABLET, FILM COATED ORAL
Refills: 0 | COMMUNITY
Start: 2019-10-30 | End: 2023-06-06

## 2019-11-26 RX ORDER — ROSUVASTATIN CALCIUM 5 MG/1
5 TABLET, COATED ORAL DAILY
Qty: 90 TABLET | Refills: 4 | Status: SHIPPED | OUTPATIENT
Start: 2019-11-26 | End: 2020-12-16 | Stop reason: SDUPTHER

## 2019-11-26 RX ORDER — AZITHROMYCIN 250 MG/1
TABLET, FILM COATED ORAL
Qty: 6 TABLET | Refills: 2 | Status: SHIPPED | OUTPATIENT
Start: 2019-11-26 | End: 2019-12-01

## 2019-11-26 RX ORDER — MELOXICAM 7.5 MG/1
TABLET ORAL
Refills: 0 | COMMUNITY
Start: 2019-10-30 | End: 2023-06-06

## 2019-11-26 RX ORDER — ROSUVASTATIN CALCIUM 5 MG/1
TABLET, COATED ORAL
Refills: 5 | COMMUNITY
Start: 2019-10-08 | End: 2019-11-26 | Stop reason: SDUPTHER

## 2019-11-26 NOTE — PROGRESS NOTES
Subjective:       Patient ID: Lena Zamarripa is a 59 y.o. female.    Chief Complaint: Follow-up (check up - refills)    Patient is known, to me and presents with   Chief Complaint   Patient presents with    Follow-up     check up - refills   .  Denies chest pain and shortness of breath.  Here for check up and refills. Financially strapped for screenings at this time. Cannot take flu shot  HPI  Review of Systems   Constitutional: Positive for activity change and unexpected weight change. Negative for appetite change, fatigue and fever.   HENT: Negative for congestion, ear discharge, ear pain, hearing loss, postnasal drip, rhinorrhea, tinnitus and trouble swallowing.    Eyes: Negative for photophobia, pain, discharge and visual disturbance.   Respiratory: Negative for cough, chest tightness, shortness of breath, wheezing and stridor.    Cardiovascular: Negative for chest pain, palpitations and leg swelling.   Gastrointestinal: Negative for abdominal distention, blood in stool, constipation, diarrhea and vomiting.   Endocrine: Negative for polydipsia and polyuria.   Genitourinary: Negative for difficulty urinating, dysuria, frequency, hematuria, menstrual problem and urgency.   Musculoskeletal: Positive for arthralgias and neck pain. Negative for back pain, gait problem and joint swelling.   Skin: Negative.    Neurological: Positive for headaches. Negative for dizziness, seizures, syncope, weakness, light-headedness and numbness.   Hematological: Negative for adenopathy. Does not bruise/bleed easily.   Psychiatric/Behavioral: Negative for behavioral problems, confusion, dysphoric mood, hallucinations, sleep disturbance and suicidal ideas. The patient is not nervous/anxious.        Objective:      Physical Exam   Constitutional: She is oriented to person, place, and time. She appears well-developed and well-nourished. No distress.   HENT:   Head: Normocephalic and atraumatic.   Right Ear: External ear normal.   Left  Ear: External ear normal.   Mouth/Throat: Oropharynx is clear and moist. No oropharyngeal exudate.   Eyes: Pupils are equal, round, and reactive to light. Conjunctivae and EOM are normal. Right eye exhibits no discharge. Left eye exhibits no discharge.   Neck: Normal range of motion. Neck supple. No JVD present. No thyromegaly present.   Cardiovascular: Normal rate, regular rhythm, normal heart sounds and intact distal pulses. Exam reveals no gallop and no friction rub.   No murmur heard.  Pulmonary/Chest: Effort normal and breath sounds normal. No stridor. No respiratory distress. She has no wheezes. She has no rales. She exhibits no tenderness.   Abdominal: Soft. Bowel sounds are normal. She exhibits no distension and no mass. There is no tenderness. There is no rebound.   Musculoskeletal: Normal range of motion. She exhibits no edema or tenderness.   Lymphadenopathy:     She has no cervical adenopathy.   Neurological: She is alert and oriented to person, place, and time. She has normal reflexes. She displays normal reflexes. No cranial nerve deficit. She exhibits normal muscle tone. Coordination normal.   Skin: Skin is warm and dry. Capillary refill takes less than 2 seconds. No rash noted. No erythema. No pallor.   Psychiatric: She has a normal mood and affect. Her behavior is normal. Judgment and thought content normal.       Assessment:       1. Hyperlipidemia LDL goal <70    2. Other forms of systemic lupus erythematosus, unspecified organ involvement status    3. Uncomplicated opioid dependence    4. Morbid obesity with BMI of 40.0-44.9, adult    5. Acute bacterial sinusitis        Plan:   Lena was seen today for follow-up.    Diagnoses and all orders for this visit:    Hyperlipidemia LDL goal <70  -     rosuvastatin (CRESTOR) 5 MG tablet; Take 1 tablet (5 mg total) by mouth once daily.    Other forms of systemic lupus erythematosus, unspecified organ involvement status  -     predniSONE (DELTASONE) 20 MG  "tablet; Take 1 tablet (20 mg total) by mouth once daily.    Uncomplicated opioid dependence    Morbid obesity with BMI of 40.0-44.9, adult    Acute bacterial sinusitis  -     azithromycin (Z-KASSANDRA) 250 MG tablet; Take 2 tablets by mouth on day 1; Take 1 tablet by mouth on days 2-5    "This note will not be shared with the patient."  Lab drawn today CBC, CMP, TSH, FLP  Limit the cholesterol in your diet to less than 300 mg per day.  Fats should contribute no more than 20 to 35% of your daily calories.  Less than 7 to 10% of your calories should come from saturated fat.  Avoid saturated fat products e.g., butter, some oils, meat, and poultry fat contain a lot of saturated fat.  Check food labels for fat and cholesterol content. Choose the foods with less fat per serving.  Limit the amount of butter and margarine you eat.  Use salad dressings and margarine made with polyunsaturated and monunsaturated fats.  Use egg whites or egg substitutes rather than whole eggs.  Replace whole-milk dairy products with nonfat or low-fat mild, cheese, spreads, and yogurt.  Eat skinless chicken, turkey, fish, and meatless entrees more often than red meat.  Choose lean cuts of meat and trim off all visible fat. Keep portion sizes moderate.  Avoid fatty desserts such as ice cream, cream-filled cakes, and cheesecakes. Choose fresh fruits, nonfat frozen yogurt, Popsicles, etc.  Reduce the amount of fried foods, vending machine food, and fast food you eat.  Eat fruits and vegetables (especially fresh fruits and leafy vegetables), beans, and whole grains daily. The fiber in these foods helps lower cholesterol.  Look for low-fat or nonfat varieties of the foods you like to eat or look for substitutes.  You may need to exercise 60 minutes a day to prevent weight gain and 90 minutes a day to lose weight.  RTC as scheduled  "

## 2019-11-26 NOTE — PATIENT INSTRUCTIONS
Medicine for Cholesterol Control  Cholesterol is a waxy substance in your bloodstream. If there is too much of it in your blood, it can build up in the walls of your arteries. Over time, this buildup can lead to coronary disease. Coronary disease can put you at risk for a heart attack or stroke. It can also put you at risk for disease of the arteries in your legs and other places in your body. Medicine can give you the extra help you need to control your cholesterol.    How medicine helps  Different kinds of medicines help with cholesterol levels. Some help lower your LDL (bad cholesterol). Some help raise your HDL (good cholesterol). Other medicines lower your triglyceride levels. And some do all three. It may take some time to find the right medicine for you. Taking medicine will be only one part of your cholesterol control plan. You will still need to eat right and get regular exercise.  Talk with your healthcare provider to find out your risks for having a heart attack. Your provider can tell you what goals to use to see if your treatment is working. These goals may vary based on your health issues or family history. Also ask your provider how often your cholesterol should be checked as part of your treatment plan. You may need to fast before getting your cholesterol checked.  Taking your medicine  It is important to:  · Tell your healthcare provider about any other medicines you take. This includes over-the-counter medicines. It also includes vitamins and herbs.  · Take your medicine exactly as directed. This helps make sure that it works as it should.  · Don't skip a dose.  · Don't stop taking it if you feel better.  · Don't stop taking it when your cholesterol numbers improve.  · Order your refill before your medicine runs out.  Side effects  Medicines can cause side effects. These often occur at the start of taking a new medicine. Side effects can include headache and upset stomach. Rarely you can have  muscle aches. Tell your healthcare provider about any side effects you have.  When to call your healthcare provider  When taking your medicine, let your healthcare provider know if you have:  · Yellowing of the whites of eyes  · Blurred vision  · Muscle aches  · Trouble breathing   High-risk groups  Some people may need to take medicines called statins to control their cholesterol. This is in addition to eating a healthy diet and getting regular exercise.  Statins can help you stay healthy. They can also help prevent a heart attack or stroke. You may need to take a statin if you are in one of these groups:  · Adults who have had a heart attack or stroke. Or adults who have had peripheral vascular disease, a ministroke (transient ischemic attack), or stable or unstable angina. This group also includes people who have had a procedure to restore blood flow through a blocked artery. These procedures include percutaneous coronary intervention, angioplasty, stent, and open-heart bypass surgery.  · Adults who have diabetes. Or adults who are at higher risk of having a heart attack or stroke and have an LDL cholesterol level of 70 to 189 mg/dL  · Adults who are 21 years old or older and have an LDL cholesterol level of 190 mg/dL or higher.  If you are in a high-risk group, talk with your healthcare provider about your treatment goals. Make sure you understand why these goals are important, based on your own health history and your family history of heart disease or high cholesterol.  Make a plan to have regular cholesterol checks. You may need to fast before getting this test. Also ask your provider about any side effects your medicines may cause. Let your provider know about any side effects you have. You may need to take more than one medicine to reach the cholesterol goals that you and your provider decide on.  Date Last Reviewed: 10/1/2016  © [employer]3 [employer]. [address] [place] [address], [place]  [address]. All rights reserved. This information is not intended as a substitute for professional medical care. Always follow your healthcare professional's instructions.

## 2020-01-21 ENCOUNTER — PES CALL (OUTPATIENT)
Dept: ADMINISTRATIVE | Facility: CLINIC | Age: 60
End: 2020-01-21

## 2020-02-03 ENCOUNTER — PATIENT MESSAGE (OUTPATIENT)
Dept: INTERNAL MEDICINE | Facility: CLINIC | Age: 60
End: 2020-02-03

## 2020-02-03 DIAGNOSIS — J20.8 ACUTE BACTERIAL BRONCHITIS: Primary | ICD-10-CM

## 2020-02-03 DIAGNOSIS — B96.89 ACUTE BACTERIAL BRONCHITIS: Primary | ICD-10-CM

## 2020-02-03 RX ORDER — DOXYCYCLINE HYCLATE 100 MG
100 TABLET ORAL EVERY 12 HOURS
Qty: 14 TABLET | Refills: 0 | Status: SHIPPED | OUTPATIENT
Start: 2020-02-03 | End: 2020-02-10

## 2020-02-03 RX ORDER — PROMETHAZINE HYDROCHLORIDE AND DEXTROMETHORPHAN HYDROBROMIDE 6.25; 15 MG/5ML; MG/5ML
5 SYRUP ORAL EVERY 6 HOURS PRN
Qty: 120 ML | Refills: 0 | Status: SHIPPED | OUTPATIENT
Start: 2020-02-03 | End: 2020-02-10

## 2020-02-05 ENCOUNTER — PATIENT MESSAGE (OUTPATIENT)
Dept: INTERNAL MEDICINE | Facility: CLINIC | Age: 60
End: 2020-02-05

## 2020-02-10 ENCOUNTER — PATIENT MESSAGE (OUTPATIENT)
Dept: INTERNAL MEDICINE | Facility: CLINIC | Age: 60
End: 2020-02-10

## 2020-02-10 DIAGNOSIS — J01.90 ACUTE BACTERIAL SINUSITIS: Primary | ICD-10-CM

## 2020-02-10 DIAGNOSIS — B96.89 ACUTE BACTERIAL SINUSITIS: Primary | ICD-10-CM

## 2020-02-10 RX ORDER — DOXYCYCLINE HYCLATE 100 MG
100 TABLET ORAL EVERY 12 HOURS
Qty: 14 TABLET | Refills: 0 | Status: SHIPPED | OUTPATIENT
Start: 2020-02-10 | End: 2020-02-17

## 2020-03-02 DIAGNOSIS — I73.00 RAYNAUD'S DISEASE WITHOUT GANGRENE: ICD-10-CM

## 2020-03-02 DIAGNOSIS — I10 BENIGN ESSENTIAL HTN: ICD-10-CM

## 2020-03-02 RX ORDER — AMLODIPINE BESYLATE 5 MG/1
TABLET ORAL
Qty: 180 TABLET | Refills: 1 | Status: SHIPPED | OUTPATIENT
Start: 2020-03-02 | End: 2020-09-02

## 2020-03-24 ENCOUNTER — PATIENT MESSAGE (OUTPATIENT)
Dept: INTERNAL MEDICINE | Facility: CLINIC | Age: 60
End: 2020-03-24

## 2020-03-24 DIAGNOSIS — B96.89 ACUTE BACTERIAL SINUSITIS: Primary | ICD-10-CM

## 2020-03-24 DIAGNOSIS — J01.90 ACUTE BACTERIAL SINUSITIS: Primary | ICD-10-CM

## 2020-03-25 RX ORDER — AZITHROMYCIN 250 MG/1
TABLET, FILM COATED ORAL
Qty: 6 TABLET | Refills: 2 | Status: SHIPPED | OUTPATIENT
Start: 2020-03-25 | End: 2020-03-30

## 2020-06-30 ENCOUNTER — PATIENT MESSAGE (OUTPATIENT)
Dept: INTERNAL MEDICINE | Facility: CLINIC | Age: 60
End: 2020-06-30

## 2020-06-30 RX ORDER — NYSTATIN 100000 [USP'U]/ML
6 SUSPENSION ORAL 4 TIMES DAILY
Qty: 240 ML | Refills: 0 | Status: SHIPPED | OUTPATIENT
Start: 2020-06-30 | End: 2020-07-10

## 2020-09-02 DIAGNOSIS — I73.00 RAYNAUD'S DISEASE WITHOUT GANGRENE: ICD-10-CM

## 2020-09-02 DIAGNOSIS — I10 BENIGN ESSENTIAL HTN: ICD-10-CM

## 2020-09-02 RX ORDER — AMLODIPINE BESYLATE 5 MG/1
TABLET ORAL
Qty: 180 TABLET | Refills: 1 | Status: SHIPPED | OUTPATIENT
Start: 2020-09-02 | End: 2021-08-11 | Stop reason: SDUPTHER

## 2020-12-16 DIAGNOSIS — M32.8 OTHER FORMS OF SYSTEMIC LUPUS ERYTHEMATOSUS, UNSPECIFIED ORGAN INVOLVEMENT STATUS: ICD-10-CM

## 2020-12-16 DIAGNOSIS — E78.5 HYPERLIPIDEMIA LDL GOAL <70: ICD-10-CM

## 2020-12-17 RX ORDER — ROSUVASTATIN CALCIUM 5 MG/1
5 TABLET, COATED ORAL DAILY
Qty: 90 TABLET | Refills: 4 | Status: SHIPPED | OUTPATIENT
Start: 2020-12-17 | End: 2021-09-09 | Stop reason: SDUPTHER

## 2020-12-17 RX ORDER — PREDNISONE 20 MG/1
20 TABLET ORAL DAILY
Qty: 90 TABLET | Refills: 1 | Status: SHIPPED | OUTPATIENT
Start: 2020-12-17 | End: 2023-06-06

## 2021-01-04 ENCOUNTER — PATIENT MESSAGE (OUTPATIENT)
Dept: ADMINISTRATIVE | Facility: HOSPITAL | Age: 61
End: 2021-01-04

## 2021-04-05 ENCOUNTER — PATIENT MESSAGE (OUTPATIENT)
Dept: ADMINISTRATIVE | Facility: HOSPITAL | Age: 61
End: 2021-04-05

## 2021-04-05 DIAGNOSIS — J01.90 ACUTE BACTERIAL SINUSITIS: ICD-10-CM

## 2021-04-05 DIAGNOSIS — B96.89 ACUTE BACTERIAL SINUSITIS: ICD-10-CM

## 2021-04-05 RX ORDER — AZITHROMYCIN 250 MG/1
TABLET, FILM COATED ORAL
Qty: 6 TABLET | Refills: 0 | Status: SHIPPED | OUTPATIENT
Start: 2021-04-05 | End: 2023-02-17 | Stop reason: SDUPTHER

## 2021-05-04 ENCOUNTER — PATIENT MESSAGE (OUTPATIENT)
Dept: INTERNAL MEDICINE | Facility: CLINIC | Age: 61
End: 2021-05-04

## 2021-05-04 DIAGNOSIS — E78.5 HYPERLIPIDEMIA LDL GOAL <70: ICD-10-CM

## 2021-05-04 DIAGNOSIS — I10 BENIGN ESSENTIAL HTN: Primary | ICD-10-CM

## 2021-05-06 ENCOUNTER — PATIENT MESSAGE (OUTPATIENT)
Dept: RESEARCH | Facility: HOSPITAL | Age: 61
End: 2021-05-06

## 2021-05-08 LAB
25(OH)D3+25(OH)D2 SERPL-MCNC: 68.8 NG/ML (ref 30–100)
ALBUMIN SERPL-MCNC: 4.2 G/DL (ref 3.8–4.8)
ALBUMIN/CREAT UR: <7 MG/G CREAT (ref 0–29)
ALBUMIN/GLOB SERPL: 1.9 {RATIO} (ref 1.2–2.2)
ALP SERPL-CCNC: 154 IU/L (ref 39–117)
ALT SERPL-CCNC: 31 IU/L (ref 0–32)
AST SERPL-CCNC: 28 IU/L (ref 0–40)
BASOPHILS # BLD AUTO: 0.1 X10E3/UL (ref 0–0.2)
BASOPHILS NFR BLD AUTO: 1 %
BILIRUB SERPL-MCNC: <0.2 MG/DL (ref 0–1.2)
BUN SERPL-MCNC: 12 MG/DL (ref 8–27)
BUN/CREAT SERPL: 20 (ref 12–28)
CALCIUM SERPL-MCNC: 9.6 MG/DL (ref 8.7–10.3)
CHLORIDE SERPL-SCNC: 103 MMOL/L (ref 96–106)
CHOLEST SERPL-MCNC: 139 MG/DL (ref 100–199)
CO2 SERPL-SCNC: 24 MMOL/L (ref 20–29)
CREAT SERPL-MCNC: 0.59 MG/DL (ref 0.57–1)
CREAT UR-MCNC: 45.9 MG/DL
EOSINOPHIL # BLD AUTO: 0 X10E3/UL (ref 0–0.4)
EOSINOPHIL NFR BLD AUTO: 0 %
ERYTHROCYTE [DISTWIDTH] IN BLOOD BY AUTOMATED COUNT: 13.5 % (ref 11.7–15.4)
GLOBULIN SER CALC-MCNC: 2.2 G/DL (ref 1.5–4.5)
GLUCOSE SERPL-MCNC: 173 MG/DL (ref 65–99)
HCT VFR BLD AUTO: 46.7 % (ref 34–46.6)
HDLC SERPL-MCNC: 46 MG/DL
HGB BLD-MCNC: 14.4 G/DL (ref 11.1–15.9)
IMM GRANULOCYTES # BLD AUTO: 0 X10E3/UL (ref 0–0.1)
IMM GRANULOCYTES NFR BLD AUTO: 0 %
LDLC SERPL CALC-MCNC: 62 MG/DL (ref 0–99)
LYMPHOCYTES # BLD AUTO: 2.8 X10E3/UL (ref 0.7–3.1)
LYMPHOCYTES NFR BLD AUTO: 33 %
MCH RBC QN AUTO: 26 PG (ref 26.6–33)
MCHC RBC AUTO-ENTMCNC: 30.8 G/DL (ref 31.5–35.7)
MCV RBC AUTO: 84 FL (ref 79–97)
MICROALBUMIN UR-MCNC: <3 UG/ML
MONOCYTES # BLD AUTO: 0.5 X10E3/UL (ref 0.1–0.9)
MONOCYTES NFR BLD AUTO: 6 %
NEUTROPHILS # BLD AUTO: 5.2 X10E3/UL (ref 1.4–7)
NEUTROPHILS NFR BLD AUTO: 60 %
PLATELET # BLD AUTO: 351 X10E3/UL (ref 150–450)
POTASSIUM SERPL-SCNC: 4.7 MMOL/L (ref 3.5–5.2)
PROT SERPL-MCNC: 6.4 G/DL (ref 6–8.5)
RBC # BLD AUTO: 5.53 X10E6/UL (ref 3.77–5.28)
SODIUM SERPL-SCNC: 142 MMOL/L (ref 134–144)
SPECIMEN STATUS REPORT: NORMAL
TRIGL SERPL-MCNC: 189 MG/DL (ref 0–149)
TSH SERPL DL<=0.005 MIU/L-ACNC: 1.47 UIU/ML (ref 0.45–4.5)
VLDLC SERPL CALC-MCNC: 31 MG/DL (ref 5–40)
WBC # BLD AUTO: 8.7 X10E3/UL (ref 3.4–10.8)

## 2021-07-06 ENCOUNTER — PATIENT MESSAGE (OUTPATIENT)
Dept: ADMINISTRATIVE | Facility: HOSPITAL | Age: 61
End: 2021-07-06

## 2021-09-09 ENCOUNTER — PATIENT MESSAGE (OUTPATIENT)
Dept: INTERNAL MEDICINE | Facility: CLINIC | Age: 61
End: 2021-09-09

## 2021-09-09 DIAGNOSIS — B96.89 ACUTE BACTERIAL SINUSITIS: ICD-10-CM

## 2021-09-09 DIAGNOSIS — J01.90 ACUTE BACTERIAL SINUSITIS: ICD-10-CM

## 2021-09-09 DIAGNOSIS — E78.5 HYPERLIPIDEMIA LDL GOAL <70: ICD-10-CM

## 2021-09-09 DIAGNOSIS — M32.8 OTHER FORMS OF SYSTEMIC LUPUS ERYTHEMATOSUS, UNSPECIFIED ORGAN INVOLVEMENT STATUS: ICD-10-CM

## 2021-09-09 RX ORDER — ROSUVASTATIN CALCIUM 5 MG/1
5 TABLET, COATED ORAL DAILY
Qty: 30 TABLET | Refills: 0 | Status: SHIPPED | OUTPATIENT
Start: 2021-09-09 | End: 2023-06-06

## 2021-09-09 RX ORDER — PREDNISONE 20 MG/1
20 TABLET ORAL DAILY
Qty: 90 TABLET | Refills: 1 | Status: CANCELLED | OUTPATIENT
Start: 2021-09-09

## 2021-09-09 RX ORDER — AZITHROMYCIN 250 MG/1
TABLET, FILM COATED ORAL
Qty: 6 TABLET | Refills: 0 | Status: CANCELLED | OUTPATIENT
Start: 2021-09-09

## 2022-02-10 ENCOUNTER — PATIENT MESSAGE (OUTPATIENT)
Dept: ADMINISTRATIVE | Facility: HOSPITAL | Age: 62
End: 2022-02-10
Payer: MEDICARE

## 2022-04-04 ENCOUNTER — PATIENT MESSAGE (OUTPATIENT)
Dept: ADMINISTRATIVE | Facility: HOSPITAL | Age: 62
End: 2022-04-04
Payer: MEDICARE

## 2022-09-20 ENCOUNTER — PATIENT OUTREACH (OUTPATIENT)
Dept: ADMINISTRATIVE | Facility: HOSPITAL | Age: 62
End: 2022-09-20
Payer: MEDICARE

## 2022-10-03 ENCOUNTER — PATIENT MESSAGE (OUTPATIENT)
Dept: ADMINISTRATIVE | Facility: HOSPITAL | Age: 62
End: 2022-10-03
Payer: MEDICARE

## 2022-12-12 ENCOUNTER — LAB VISIT (OUTPATIENT)
Dept: LAB | Facility: HOSPITAL | Age: 62
End: 2022-12-12
Payer: MEDICARE

## 2022-12-12 ENCOUNTER — OFFICE VISIT (OUTPATIENT)
Dept: INTERNAL MEDICINE | Facility: CLINIC | Age: 62
End: 2022-12-12
Payer: MEDICARE

## 2022-12-12 VITALS
RESPIRATION RATE: 18 BRPM | HEIGHT: 58 IN | WEIGHT: 194.88 LBS | HEART RATE: 73 BPM | BODY MASS INDEX: 40.91 KG/M2 | SYSTOLIC BLOOD PRESSURE: 142 MMHG | OXYGEN SATURATION: 95 % | DIASTOLIC BLOOD PRESSURE: 90 MMHG

## 2022-12-12 DIAGNOSIS — I10 BENIGN ESSENTIAL HTN: Primary | ICD-10-CM

## 2022-12-12 DIAGNOSIS — E66.01 MORBID OBESITY WITH BMI OF 40.0-44.9, ADULT: ICD-10-CM

## 2022-12-12 DIAGNOSIS — Z12.11 COLON CANCER SCREENING: ICD-10-CM

## 2022-12-12 DIAGNOSIS — Z12.39 ENCOUNTER FOR SCREENING FOR MALIGNANT NEOPLASM OF BREAST, UNSPECIFIED SCREENING MODALITY: ICD-10-CM

## 2022-12-12 DIAGNOSIS — M32.8 OTHER FORMS OF SYSTEMIC LUPUS ERYTHEMATOSUS, UNSPECIFIED ORGAN INVOLVEMENT STATUS: ICD-10-CM

## 2022-12-12 DIAGNOSIS — E78.5 HYPERLIPIDEMIA LDL GOAL <70: ICD-10-CM

## 2022-12-12 PROCEDURE — 3080F DIAST BP >= 90 MM HG: CPT | Mod: CPTII,S$GLB,, | Performed by: NURSE PRACTITIONER

## 2022-12-12 PROCEDURE — 1159F MED LIST DOCD IN RCRD: CPT | Mod: CPTII,S$GLB,, | Performed by: NURSE PRACTITIONER

## 2022-12-12 PROCEDURE — 3008F BODY MASS INDEX DOCD: CPT | Mod: CPTII,S$GLB,, | Performed by: NURSE PRACTITIONER

## 2022-12-12 PROCEDURE — 99214 OFFICE O/P EST MOD 30 MIN: CPT | Mod: S$GLB,,, | Performed by: NURSE PRACTITIONER

## 2022-12-12 PROCEDURE — 3077F SYST BP >= 140 MM HG: CPT | Mod: CPTII,S$GLB,, | Performed by: NURSE PRACTITIONER

## 2022-12-12 PROCEDURE — 1159F PR MEDICATION LIST DOCUMENTED IN MEDICAL RECORD: ICD-10-PCS | Mod: CPTII,S$GLB,, | Performed by: NURSE PRACTITIONER

## 2022-12-12 PROCEDURE — 99214 PR OFFICE/OUTPT VISIT, EST, LEVL IV, 30-39 MIN: ICD-10-PCS | Mod: S$GLB,,, | Performed by: NURSE PRACTITIONER

## 2022-12-12 PROCEDURE — 99999 PR PBB SHADOW E&M-EST. PATIENT-LVL V: CPT | Mod: PBBFAC,,, | Performed by: NURSE PRACTITIONER

## 2022-12-12 PROCEDURE — 82274 ASSAY TEST FOR BLOOD FECAL: CPT | Mod: HCNC | Performed by: NURSE PRACTITIONER

## 2022-12-12 PROCEDURE — 3080F PR MOST RECENT DIASTOLIC BLOOD PRESSURE >= 90 MM HG: ICD-10-PCS | Mod: CPTII,S$GLB,, | Performed by: NURSE PRACTITIONER

## 2022-12-12 PROCEDURE — 3077F PR MOST RECENT SYSTOLIC BLOOD PRESSURE >= 140 MM HG: ICD-10-PCS | Mod: CPTII,S$GLB,, | Performed by: NURSE PRACTITIONER

## 2022-12-12 PROCEDURE — 3008F PR BODY MASS INDEX (BMI) DOCUMENTED: ICD-10-PCS | Mod: CPTII,S$GLB,, | Performed by: NURSE PRACTITIONER

## 2022-12-12 PROCEDURE — 99999 PR PBB SHADOW E&M-EST. PATIENT-LVL V: ICD-10-PCS | Mod: PBBFAC,,, | Performed by: NURSE PRACTITIONER

## 2022-12-12 RX ORDER — FUROSEMIDE 20 MG/1
20 TABLET ORAL DAILY
Qty: 30 TABLET | Refills: 5 | Status: SHIPPED | OUTPATIENT
Start: 2022-12-12 | End: 2023-12-12

## 2022-12-12 RX ORDER — ABALOPARATIDE 2000 UG/ML
INJECTION, SOLUTION SUBCUTANEOUS
COMMUNITY
Start: 2022-09-12 | End: 2023-06-06

## 2022-12-12 RX ORDER — BUPRENORPHINE 20 UG/H
PATCH TRANSDERMAL
COMMUNITY
Start: 2022-09-06 | End: 2023-06-06

## 2022-12-12 RX ORDER — IBUPROFEN 800 MG/1
TABLET ORAL
COMMUNITY
Start: 2022-08-10

## 2022-12-12 NOTE — PROGRESS NOTES
Subjective:       Patient ID: Lena Zamarripa is a 62 y.o. female.    Chief Complaint: Follow-up    Patient is known, to me and presents with   Chief Complaint   Patient presents with    Follow-up   .  Denies chest pain and shortness of breath.  Patient whom I have not seen in some time presents with checkup. She is doing very well with no complaints today. Due to have labs and other screenings.   Follow-up  Associated symptoms include arthralgias. Pertinent negatives include no chest pain, congestion, coughing, fatigue, fever, headaches, joint swelling, neck pain, numbness or weakness.   Review of Systems   Constitutional:  Negative for activity change, appetite change, fatigue, fever and unexpected weight change.   HENT:  Negative for congestion, ear discharge, ear pain, hearing loss, postnasal drip and tinnitus.    Eyes:  Negative for photophobia, pain and visual disturbance.   Respiratory:  Negative for cough, shortness of breath, wheezing and stridor.    Cardiovascular:  Negative for chest pain, palpitations and leg swelling.   Gastrointestinal:  Negative for abdominal distention.   Genitourinary:  Negative for difficulty urinating, dysuria, frequency, hematuria and urgency.   Musculoskeletal:  Positive for arthralgias. Negative for back pain, gait problem, joint swelling and neck pain.   Skin: Negative.    Neurological:  Negative for dizziness, seizures, syncope, weakness, light-headedness, numbness and headaches.   Hematological:  Negative for adenopathy. Does not bruise/bleed easily.   Psychiatric/Behavioral:  Negative for behavioral problems, confusion, dysphoric mood, hallucinations, sleep disturbance and suicidal ideas. The patient is not nervous/anxious.      Objective:      Physical Exam  Constitutional:       General: She is not in acute distress.     Appearance: She is well-developed.   HENT:      Head: Normocephalic and atraumatic.      Right Ear: Tympanic membrane and external ear normal.      Left  Ear: Tympanic membrane and external ear normal.      Nose: Nose normal.      Mouth/Throat:      Mouth: Mucous membranes are moist.      Pharynx: No oropharyngeal exudate.   Eyes:      General: No scleral icterus.        Right eye: No discharge.         Left eye: No discharge.      Conjunctiva/sclera: Conjunctivae normal.      Pupils: Pupils are equal, round, and reactive to light.   Neck:      Thyroid: No thyromegaly.      Vascular: No JVD.   Cardiovascular:      Rate and Rhythm: Normal rate and regular rhythm.      Heart sounds: Normal heart sounds. No murmur heard.    No friction rub. No gallop.   Pulmonary:      Effort: Pulmonary effort is normal. No respiratory distress.      Breath sounds: Normal breath sounds. No stridor. No wheezing or rales.   Chest:      Chest wall: No tenderness.   Abdominal:      General: Bowel sounds are normal. There is no distension.      Palpations: Abdomen is soft. There is no mass.      Tenderness: There is no abdominal tenderness. There is no right CVA tenderness, left CVA tenderness, guarding or rebound.      Hernia: No hernia is present.   Musculoskeletal:         General: No swelling, tenderness, deformity or signs of injury. Normal range of motion.      Cervical back: Normal range of motion and neck supple.      Right lower leg: No edema.      Left lower leg: No edema.   Lymphadenopathy:      Cervical: No cervical adenopathy.   Skin:     General: Skin is warm and dry.      Capillary Refill: Capillary refill takes less than 2 seconds.      Coloration: Skin is not jaundiced or pale.      Findings: No bruising, erythema, lesion or rash.   Neurological:      General: No focal deficit present.      Mental Status: She is alert and oriented to person, place, and time.      Cranial Nerves: No cranial nerve deficit.      Sensory: No sensory deficit.      Motor: No weakness or abnormal muscle tone.      Coordination: Coordination normal.      Gait: Gait normal.      Deep Tendon Reflexes:  Reflexes are normal and symmetric. Reflexes normal.   Psychiatric:         Mood and Affect: Mood normal.         Behavior: Behavior normal.         Thought Content: Thought content normal.         Judgment: Judgment normal.       Assessment:       1. Benign essential HTN    2. Hyperlipidemia LDL goal <70    3. Other forms of systemic lupus erythematosus, unspecified organ involvement status    4. Morbid obesity with BMI of 40.0-44.9, adult    5. Encounter for screening for malignant neoplasm of breast, unspecified screening modality    6. Colon cancer screening          Plan:   1. Benign essential HTN  -     CBC Auto Differential; Future; Expected date: 12/12/2022  -     Comprehensive Metabolic Panel; Future; Expected date: 12/12/2022  -     Microalbumin/Creatinine Ratio, Urine; Future; Expected date: 12/12/2022  -     furosemide (LASIX) 20 MG tablet; Take 1 tablet (20 mg total) by mouth once daily.  Dispense: 30 tablet; Refill: 5    2. Hyperlipidemia LDL goal <70  -     CBC Auto Differential; Future; Expected date: 12/12/2022  -     Comprehensive Metabolic Panel; Future; Expected date: 12/12/2022  -     TSH; Future; Expected date: 12/12/2022  -     Lipid Panel; Future; Expected date: 12/12/2022    3. Other forms of systemic lupus erythematosus, unspecified organ involvement status  -     CBC Auto Differential; Future; Expected date: 12/12/2022  -     Comprehensive Metabolic Panel; Future; Expected date: 12/12/2022    4. Morbid obesity with BMI of 40.0-44.9, adult  -     CBC Auto Differential; Future; Expected date: 12/12/2022  -     Comprehensive Metabolic Panel; Future; Expected date: 12/12/2022  -     Hemoglobin A1C; Future; Expected date: 12/12/2022    5. Encounter for screening for malignant neoplasm of breast, unspecified screening modality  -     Mammo Digital Screening Bilat w/ Jaxon; Future; Expected date: 12/12/2022    6. Colon cancer screening  -     Fecal Immunochemical Test (iFOBT); Future; Expected  "date: 12/12/2022     "This note will not be shared with the patient."  Refills on meds.  Medication compliance was discussed with the patient.   Medication side effects were discussed.  The patient was instructed on using exercise frequently to reduce blood pressure.  Thirty to forty-five minutes of brisk walking three to four times a week is often helpful to lower your blood pressure.  Monitor blood pressures at home and to record the values in a log.  The patient was instructed to monitor weight closely and to try to keep it as close to ideal body weight as possible.  Reduce salt intake to less than 2 grams per day.  Do not add salt to food at the table.  Reduce or get rid of salt used in cooking.  Limit processed and fast foods.  Read package labels for amount of salt (soduim) in foods.  Losing weight, even just 10 pounds, of can decrease blood pressure.   Rtc as scheduled  "

## 2022-12-15 ENCOUNTER — PATIENT MESSAGE (OUTPATIENT)
Dept: INTERNAL MEDICINE | Facility: CLINIC | Age: 62
End: 2022-12-15
Payer: MEDICARE

## 2022-12-15 LAB
ALBUMIN SERPL-MCNC: 4.8 G/DL (ref 3.8–4.8)
ALBUMIN/CREAT UR: <15 MG/G CREAT (ref 0–29)
ALBUMIN/GLOB SERPL: 1.9 {RATIO} (ref 1.2–2.2)
ALP SERPL-CCNC: 112 IU/L (ref 44–121)
ALT SERPL-CCNC: 15 IU/L (ref 0–32)
AST SERPL-CCNC: 17 IU/L (ref 0–40)
BASOPHILS # BLD AUTO: 0.1 X10E3/UL (ref 0–0.2)
BASOPHILS NFR BLD AUTO: 1 %
BILIRUB SERPL-MCNC: 0.2 MG/DL (ref 0–1.2)
BUN SERPL-MCNC: 16 MG/DL (ref 8–27)
BUN/CREAT SERPL: 24 (ref 12–28)
CALCIUM SERPL-MCNC: 9.7 MG/DL (ref 8.7–10.3)
CHLORIDE SERPL-SCNC: 99 MMOL/L (ref 96–106)
CHOLEST SERPL-MCNC: 230 MG/DL (ref 100–199)
CO2 SERPL-SCNC: 25 MMOL/L (ref 20–29)
CREAT SERPL-MCNC: 0.66 MG/DL (ref 0.57–1)
CREAT UR-MCNC: 20.5 MG/DL
EOSINOPHIL # BLD AUTO: 0.3 X10E3/UL (ref 0–0.4)
EOSINOPHIL NFR BLD AUTO: 3 %
ERYTHROCYTE [DISTWIDTH] IN BLOOD BY AUTOMATED COUNT: 13.8 % (ref 11.7–15.4)
EST. GFR  (NO RACE VARIABLE): 99 ML/MIN/1.73
GLOBULIN SER CALC-MCNC: 2.5 G/DL (ref 1.5–4.5)
GLUCOSE SERPL-MCNC: 94 MG/DL (ref 70–99)
HBA1C MFR BLD: 6.1 % (ref 4.8–5.6)
HCT VFR BLD AUTO: 48.3 % (ref 34–46.6)
HDLC SERPL-MCNC: 47 MG/DL
HEMOCCULT STL QL IA: NEGATIVE
HGB BLD-MCNC: 15.1 G/DL (ref 11.1–15.9)
IMM GRANULOCYTES # BLD AUTO: 0 X10E3/UL (ref 0–0.1)
IMM GRANULOCYTES NFR BLD AUTO: 0 %
LDLC SERPL CALC-MCNC: 144 MG/DL (ref 0–99)
LYMPHOCYTES # BLD AUTO: 4.1 X10E3/UL (ref 0.7–3.1)
LYMPHOCYTES NFR BLD AUTO: 41 %
MCH RBC QN AUTO: 26.5 PG (ref 26.6–33)
MCHC RBC AUTO-ENTMCNC: 31.3 G/DL (ref 31.5–35.7)
MCV RBC AUTO: 85 FL (ref 79–97)
MICROALBUMIN UR-MCNC: <3 UG/ML
MONOCYTES # BLD AUTO: 0.7 X10E3/UL (ref 0.1–0.9)
MONOCYTES NFR BLD AUTO: 7 %
NEUTROPHILS # BLD AUTO: 4.8 X10E3/UL (ref 1.4–7)
NEUTROPHILS NFR BLD AUTO: 48 %
PLATELET # BLD AUTO: 368 X10E3/UL (ref 150–450)
POTASSIUM SERPL-SCNC: 4.9 MMOL/L (ref 3.5–5.2)
PROT SERPL-MCNC: 7.3 G/DL (ref 6–8.5)
RBC # BLD AUTO: 5.7 X10E6/UL (ref 3.77–5.28)
SODIUM SERPL-SCNC: 141 MMOL/L (ref 134–144)
SPECIMEN STATUS REPORT: NORMAL
TRIGL SERPL-MCNC: 214 MG/DL (ref 0–149)
TSH SERPL DL<=0.005 MIU/L-ACNC: 2.23 UIU/ML (ref 0.45–4.5)
VLDLC SERPL CALC-MCNC: 39 MG/DL (ref 5–40)
WBC # BLD AUTO: 10.1 X10E3/UL (ref 3.4–10.8)

## 2022-12-16 ENCOUNTER — PATIENT MESSAGE (OUTPATIENT)
Dept: INTERNAL MEDICINE | Facility: CLINIC | Age: 62
End: 2022-12-16
Payer: MEDICARE

## 2023-01-04 ENCOUNTER — PATIENT MESSAGE (OUTPATIENT)
Dept: INTERNAL MEDICINE | Facility: CLINIC | Age: 63
End: 2023-01-04
Payer: MEDICARE

## 2023-01-05 RX ORDER — DIPHENOXYLATE HYDROCHLORIDE AND ATROPINE SULFATE 2.5; .025 MG/1; MG/1
1 TABLET ORAL 4 TIMES DAILY PRN
Qty: 20 TABLET | Refills: 0 | Status: SHIPPED | OUTPATIENT
Start: 2023-01-05 | End: 2023-06-06

## 2023-01-18 ENCOUNTER — PATIENT MESSAGE (OUTPATIENT)
Dept: INTERNAL MEDICINE | Facility: CLINIC | Age: 63
End: 2023-01-18
Payer: MEDICARE

## 2023-01-18 DIAGNOSIS — B96.89 ACUTE BACTERIAL SINUSITIS: Primary | ICD-10-CM

## 2023-01-18 DIAGNOSIS — J01.90 ACUTE BACTERIAL SINUSITIS: Primary | ICD-10-CM

## 2023-01-19 ENCOUNTER — PATIENT MESSAGE (OUTPATIENT)
Dept: INTERNAL MEDICINE | Facility: CLINIC | Age: 63
End: 2023-01-19
Payer: MEDICARE

## 2023-01-19 RX ORDER — AZITHROMYCIN 250 MG/1
TABLET, FILM COATED ORAL
Qty: 6 TABLET | Refills: 0 | Status: SHIPPED | OUTPATIENT
Start: 2023-01-19 | End: 2023-01-24

## 2023-01-19 RX ORDER — PROMETHAZINE HYDROCHLORIDE AND DEXTROMETHORPHAN HYDROBROMIDE 6.25; 15 MG/5ML; MG/5ML
5 SYRUP ORAL EVERY 6 HOURS PRN
Qty: 120 ML | Refills: 0 | Status: SHIPPED | OUTPATIENT
Start: 2023-01-19 | End: 2023-01-26

## 2023-02-07 DIAGNOSIS — Z00.00 ENCOUNTER FOR MEDICARE ANNUAL WELLNESS EXAM: ICD-10-CM

## 2023-02-09 DIAGNOSIS — Z00.00 ENCOUNTER FOR MEDICARE ANNUAL WELLNESS EXAM: ICD-10-CM

## 2023-02-17 ENCOUNTER — PATIENT MESSAGE (OUTPATIENT)
Dept: INTERNAL MEDICINE | Facility: CLINIC | Age: 63
End: 2023-02-17
Payer: MEDICARE

## 2023-02-17 DIAGNOSIS — B96.89 ACUTE BACTERIAL SINUSITIS: ICD-10-CM

## 2023-02-17 DIAGNOSIS — J01.90 ACUTE BACTERIAL SINUSITIS: ICD-10-CM

## 2023-02-17 RX ORDER — AZITHROMYCIN 250 MG/1
TABLET, FILM COATED ORAL
Qty: 6 TABLET | Refills: 0 | Status: SHIPPED | OUTPATIENT
Start: 2023-02-17 | End: 2023-06-06

## 2023-02-17 RX ORDER — METHYLPREDNISOLONE 4 MG/1
TABLET ORAL
Qty: 21 TABLET | Refills: 0 | Status: SHIPPED | OUTPATIENT
Start: 2023-02-17 | End: 2023-03-10

## 2023-06-02 ENCOUNTER — PATIENT MESSAGE (OUTPATIENT)
Dept: INTERNAL MEDICINE | Facility: CLINIC | Age: 63
End: 2023-06-02
Payer: MEDICARE

## 2023-06-06 ENCOUNTER — OFFICE VISIT (OUTPATIENT)
Dept: INTERNAL MEDICINE | Facility: CLINIC | Age: 63
End: 2023-06-06
Payer: MEDICARE

## 2023-06-06 VITALS
HEART RATE: 70 BPM | SYSTOLIC BLOOD PRESSURE: 124 MMHG | HEIGHT: 58 IN | OXYGEN SATURATION: 98 % | WEIGHT: 192.31 LBS | RESPIRATION RATE: 18 BRPM | BODY MASS INDEX: 40.37 KG/M2 | DIASTOLIC BLOOD PRESSURE: 84 MMHG

## 2023-06-06 DIAGNOSIS — I10 BENIGN ESSENTIAL HTN: Primary | ICD-10-CM

## 2023-06-06 DIAGNOSIS — F11.29 OPIOID DEPENDENCE WITH OPIOID-INDUCED DISORDER: ICD-10-CM

## 2023-06-06 DIAGNOSIS — E66.01 MORBID OBESITY WITH BMI OF 40.0-44.9, ADULT: ICD-10-CM

## 2023-06-06 DIAGNOSIS — M32.8 OTHER FORMS OF SYSTEMIC LUPUS ERYTHEMATOSUS, UNSPECIFIED ORGAN INVOLVEMENT STATUS: ICD-10-CM

## 2023-06-06 DIAGNOSIS — E78.5 HYPERLIPIDEMIA LDL GOAL <70: ICD-10-CM

## 2023-06-06 DIAGNOSIS — Z11.59 NEED FOR HEPATITIS C SCREENING TEST: ICD-10-CM

## 2023-06-06 DIAGNOSIS — R73.03 PREDIABETES: ICD-10-CM

## 2023-06-06 LAB
ALBUMIN SERPL-MCNC: 4.4 G/DL (ref 3.8–4.8)
ALBUMIN/CREAT UR: 34 MG/G CREAT (ref 0–29)
ALBUMIN/GLOB SERPL: 1.9 {RATIO} (ref 1.2–2.2)
ALP SERPL-CCNC: 114 IU/L (ref 44–121)
ALT SERPL-CCNC: 13 IU/L (ref 0–32)
AST SERPL-CCNC: 18 IU/L (ref 0–40)
BASOPHILS # BLD AUTO: 0.1 X10E3/UL (ref 0–0.2)
BASOPHILS NFR BLD AUTO: 1 %
BILIRUB SERPL-MCNC: 0.3 MG/DL (ref 0–1.2)
BUN SERPL-MCNC: 10 MG/DL (ref 8–27)
BUN/CREAT SERPL: 16 (ref 12–28)
CALCIUM SERPL-MCNC: 9.6 MG/DL (ref 8.7–10.3)
CHLORIDE SERPL-SCNC: 104 MMOL/L (ref 96–106)
CHOLEST SERPL-MCNC: 208 MG/DL (ref 100–199)
CO2 SERPL-SCNC: 23 MMOL/L (ref 20–29)
CREAT SERPL-MCNC: 0.63 MG/DL (ref 0.57–1)
CREAT UR-MCNC: 72 MG/DL
EOSINOPHIL # BLD AUTO: 0.4 X10E3/UL (ref 0–0.4)
EOSINOPHIL NFR BLD AUTO: 4 %
ERYTHROCYTE [DISTWIDTH] IN BLOOD BY AUTOMATED COUNT: 13.1 % (ref 11.7–15.4)
EST. GFR  (NO RACE VARIABLE): 99 ML/MIN/1.73
GLOBULIN SER CALC-MCNC: 2.3 G/DL (ref 1.5–4.5)
GLUCOSE SERPL-MCNC: 120 MG/DL (ref 70–99)
HBA1C MFR BLD: 6.1 % (ref 4.8–5.6)
HCT VFR BLD AUTO: 49 % (ref 34–46.6)
HDLC SERPL-MCNC: 48 MG/DL
HGB BLD-MCNC: 15.4 G/DL (ref 11.1–15.9)
IMM GRANULOCYTES # BLD AUTO: 0 X10E3/UL (ref 0–0.1)
IMM GRANULOCYTES NFR BLD AUTO: 0 %
LDLC SERPL CALC-MCNC: 137 MG/DL (ref 0–99)
LYMPHOCYTES # BLD AUTO: 3.7 X10E3/UL (ref 0.7–3.1)
LYMPHOCYTES NFR BLD AUTO: 41 %
MCH RBC QN AUTO: 26.8 PG (ref 26.6–33)
MCHC RBC AUTO-ENTMCNC: 31.4 G/DL (ref 31.5–35.7)
MCV RBC AUTO: 85 FL (ref 79–97)
MICROALBUMIN UR-MCNC: 24.7 UG/ML
MONOCYTES # BLD AUTO: 0.5 X10E3/UL (ref 0.1–0.9)
MONOCYTES NFR BLD AUTO: 6 %
NEUTROPHILS # BLD AUTO: 4.4 X10E3/UL (ref 1.4–7)
NEUTROPHILS NFR BLD AUTO: 48 %
PLATELET # BLD AUTO: 334 X10E3/UL (ref 150–450)
POTASSIUM SERPL-SCNC: 5.2 MMOL/L (ref 3.5–5.2)
PROT SERPL-MCNC: 6.7 G/DL (ref 6–8.5)
RBC # BLD AUTO: 5.74 X10E6/UL (ref 3.77–5.28)
SODIUM SERPL-SCNC: 145 MMOL/L (ref 134–144)
SPECIMEN STATUS REPORT: NORMAL
TRIGL SERPL-MCNC: 129 MG/DL (ref 0–149)
TSH SERPL DL<=0.005 MIU/L-ACNC: 1.58 UIU/ML (ref 0.45–4.5)
VLDLC SERPL CALC-MCNC: 23 MG/DL (ref 5–40)
WBC # BLD AUTO: 9.1 X10E3/UL (ref 3.4–10.8)

## 2023-06-06 PROCEDURE — 99999 PR PBB SHADOW E&M-EST. PATIENT-LVL IV: ICD-10-PCS | Mod: PBBFAC,,, | Performed by: NURSE PRACTITIONER

## 2023-06-06 PROCEDURE — 99999 PR PBB SHADOW E&M-EST. PATIENT-LVL IV: CPT | Mod: PBBFAC,,, | Performed by: NURSE PRACTITIONER

## 2023-06-06 PROCEDURE — 1159F MED LIST DOCD IN RCRD: CPT | Mod: CPTII,S$GLB,, | Performed by: NURSE PRACTITIONER

## 2023-06-06 PROCEDURE — 3079F DIAST BP 80-89 MM HG: CPT | Mod: CPTII,S$GLB,, | Performed by: NURSE PRACTITIONER

## 2023-06-06 PROCEDURE — 3008F PR BODY MASS INDEX (BMI) DOCUMENTED: ICD-10-PCS | Mod: CPTII,S$GLB,, | Performed by: NURSE PRACTITIONER

## 2023-06-06 PROCEDURE — 3074F SYST BP LT 130 MM HG: CPT | Mod: CPTII,S$GLB,, | Performed by: NURSE PRACTITIONER

## 2023-06-06 PROCEDURE — 99214 OFFICE O/P EST MOD 30 MIN: CPT | Mod: S$GLB,,, | Performed by: NURSE PRACTITIONER

## 2023-06-06 PROCEDURE — 99214 PR OFFICE/OUTPT VISIT, EST, LEVL IV, 30-39 MIN: ICD-10-PCS | Mod: S$GLB,,, | Performed by: NURSE PRACTITIONER

## 2023-06-06 PROCEDURE — 3008F BODY MASS INDEX DOCD: CPT | Mod: CPTII,S$GLB,, | Performed by: NURSE PRACTITIONER

## 2023-06-06 PROCEDURE — 1159F PR MEDICATION LIST DOCUMENTED IN MEDICAL RECORD: ICD-10-PCS | Mod: CPTII,S$GLB,, | Performed by: NURSE PRACTITIONER

## 2023-06-06 PROCEDURE — 3074F PR MOST RECENT SYSTOLIC BLOOD PRESSURE < 130 MM HG: ICD-10-PCS | Mod: CPTII,S$GLB,, | Performed by: NURSE PRACTITIONER

## 2023-06-06 PROCEDURE — 3079F PR MOST RECENT DIASTOLIC BLOOD PRESSURE 80-89 MM HG: ICD-10-PCS | Mod: CPTII,S$GLB,, | Performed by: NURSE PRACTITIONER

## 2023-06-06 RX ORDER — ROSUVASTATIN CALCIUM 10 MG/1
10 TABLET, COATED ORAL NIGHTLY
Qty: 30 TABLET | Refills: 5 | Status: SHIPPED | OUTPATIENT
Start: 2023-06-06 | End: 2023-10-10 | Stop reason: SDUPTHER

## 2023-06-06 NOTE — PROGRESS NOTES
Subjective:       Patient ID: Lena Zamarripa is a 63 y.o. female.    Chief Complaint: Follow-up (Check up)    Patient is known, to me and presents with   Chief Complaint   Patient presents with    Follow-up     Check up   .  Denies chest pain and shortness of breath.  Patient presents with checkup and lab work. She is doing well and sees her specialists. States that she has been eating a lot of sweets lately.   Follow-up  Associated symptoms include arthralgias and neck pain. Pertinent negatives include no chest pain, congestion, coughing, fatigue, fever, headaches, joint swelling, numbness, vomiting or weakness.   Review of Systems   Constitutional:  Positive for activity change. Negative for appetite change, fatigue, fever and unexpected weight change.   HENT:  Negative for congestion, ear discharge, ear pain, hearing loss, postnasal drip, rhinorrhea, tinnitus and trouble swallowing.    Eyes:  Negative for photophobia, pain, discharge and visual disturbance.   Respiratory:  Negative for cough, chest tightness, shortness of breath, wheezing and stridor.    Cardiovascular:  Negative for chest pain, palpitations and leg swelling.   Gastrointestinal:  Negative for abdominal distention, blood in stool, constipation, diarrhea and vomiting.   Endocrine: Negative for polydipsia and polyuria.   Genitourinary:  Negative for difficulty urinating, dysuria, frequency, hematuria, menstrual problem and urgency.   Musculoskeletal:  Positive for arthralgias and neck pain. Negative for back pain, gait problem and joint swelling.   Skin: Negative.    Neurological:  Negative for dizziness, seizures, syncope, weakness, light-headedness, numbness and headaches.   Hematological:  Negative for adenopathy. Does not bruise/bleed easily.   Psychiatric/Behavioral:  Negative for behavioral problems, confusion, dysphoric mood, hallucinations, sleep disturbance and suicidal ideas. The patient is not nervous/anxious.      Objective:       Physical Exam  Constitutional:       General: She is not in acute distress.     Appearance: She is well-developed.   HENT:      Head: Normocephalic and atraumatic.      Right Ear: Tympanic membrane and external ear normal.      Left Ear: Tympanic membrane and external ear normal.      Nose: Nose normal.      Mouth/Throat:      Mouth: Mucous membranes are moist.      Pharynx: No oropharyngeal exudate.   Eyes:      General: No scleral icterus.        Right eye: No discharge.         Left eye: No discharge.      Conjunctiva/sclera: Conjunctivae normal.      Pupils: Pupils are equal, round, and reactive to light.   Neck:      Thyroid: No thyromegaly.      Vascular: No carotid bruit or JVD.   Cardiovascular:      Rate and Rhythm: Normal rate and regular rhythm.      Heart sounds: Normal heart sounds. No murmur heard.    No friction rub. No gallop.   Pulmonary:      Effort: Pulmonary effort is normal. No respiratory distress.      Breath sounds: Normal breath sounds. No stridor. No wheezing or rales.   Chest:      Chest wall: No tenderness.   Abdominal:      General: Bowel sounds are normal. There is no distension.      Palpations: Abdomen is soft. There is no mass.      Tenderness: There is no abdominal tenderness. There is no right CVA tenderness, left CVA tenderness, guarding or rebound.      Hernia: No hernia is present.   Musculoskeletal:         General: No swelling, tenderness, deformity or signs of injury.      Cervical back: Neck supple. No rigidity or tenderness. Decreased range of motion.      Right lower leg: No edema.      Left lower leg: No edema.   Lymphadenopathy:      Cervical: No cervical adenopathy.   Skin:     General: Skin is warm and dry.      Capillary Refill: Capillary refill takes less than 2 seconds.      Coloration: Skin is not jaundiced or pale.      Findings: No bruising, erythema, lesion or rash.   Neurological:      General: No focal deficit present.      Mental Status: She is alert and  oriented to person, place, and time.      Cranial Nerves: No cranial nerve deficit.      Sensory: No sensory deficit.      Motor: No weakness or abnormal muscle tone.      Coordination: Coordination normal.      Gait: Gait normal.      Deep Tendon Reflexes: Reflexes are normal and symmetric. Reflexes normal.   Psychiatric:         Mood and Affect: Mood and affect normal. Mood is not anxious or depressed.         Behavior: Behavior normal.         Thought Content: Thought content normal. Thought content does not include homicidal or suicidal ideation. Thought content does not include homicidal or suicidal plan.         Judgment: Judgment normal.       Assessment:       1. Benign essential HTN    2. Hyperlipidemia LDL goal <70    3. Prediabetes    4. Other forms of systemic lupus erythematosus, unspecified organ involvement status    5. Opioid dependence with opioid-induced disorder    6. Morbid obesity with BMI of 40.0-44.9, adult    7. Need for hepatitis C screening test        Plan:   1. Benign essential HTN  -     CBC Auto Differential; Future; Expected date: 12/06/2023  -     Comprehensive Metabolic Panel; Future; Expected date: 12/06/2023  -     Microalbumin/Creatinine Ratio, Urine; Future; Expected date: 12/06/2023    2. Hyperlipidemia LDL goal <70  -     CBC Auto Differential; Future; Expected date: 12/06/2023  -     Comprehensive Metabolic Panel; Future; Expected date: 12/06/2023  -     TSH; Future; Expected date: 12/06/2023  -     Lipid Panel; Future; Expected date: 12/06/2023  -     rosuvastatin (CRESTOR) 10 MG tablet; Take 1 tablet (10 mg total) by mouth every evening.  Dispense: 30 tablet; Refill: 5    3. Prediabetes  -     CBC Auto Differential; Future; Expected date: 12/06/2023  -     Comprehensive Metabolic Panel; Future; Expected date: 12/06/2023  -     Hemoglobin A1C; Future; Expected date: 12/06/2023    4. Other forms of systemic lupus erythematosus, unspecified organ involvement status  Assessment  "& Plan:  Stable     Orders:  -     CBC Auto Differential; Future; Expected date: 12/06/2023  -     Comprehensive Metabolic Panel; Future; Expected date: 12/06/2023    5. Opioid dependence with opioid-induced disorder  Assessment & Plan:  Stable and followed by specialists     Orders:  -     CBC Auto Differential; Future; Expected date: 12/06/2023  -     Comprehensive Metabolic Panel; Future; Expected date: 12/06/2023    6. Morbid obesity with BMI of 40.0-44.9, adult  Assessment & Plan:  Weight is stable     Orders:  -     CBC Auto Differential; Future; Expected date: 12/06/2023  -     Comprehensive Metabolic Panel; Future; Expected date: 12/06/2023    7. Need for hepatitis C screening test  -     HEPATITIS C ANTIBODY; Future; Expected date: 12/06/2023    "This note will not be shared with the patient."   Will place back on statin tx   Need to follow low carb low sugar diet  Lab drawn today CBC, CMP, TSH, FLP  Limit the cholesterol in your diet to less than 300 mg per day.  Fats should contribute no more than 20 to 35% of your daily calories.  Less than 7 to 10% of your calories should come from saturated fat.  Avoid saturated fat products e.g., butter, some oils, meat, and poultry fat contain a lot of saturated fat.  Check food labels for fat and cholesterol content. Choose the foods with less fat per serving.  Limit the amount of butter and margarine you eat.  Use salad dressings and margarine made with polyunsaturated and monunsaturated fats.  Use egg whites or egg substitutes rather than whole eggs.  Replace whole-milk dairy products with nonfat or low-fat mild, cheese, spreads, and yogurt.  Eat skinless chicken, turkey, fish, and meatless entrees more often than red meat.  Choose lean cuts of meat and trim off all visible fat. Keep portion sizes moderate.  Avoid fatty desserts such as ice cream, cream-filled cakes, and cheesecakes. Choose fresh fruits, nonfat frozen yogurt, Popsicles, etc.  Reduce the amount of " fried foods, vending machine food, and fast food you eat.  Eat fruits and vegetables (especially fresh fruits and leafy vegetables), beans, and whole grains daily. The fiber in these foods helps lower cholesterol.  Look for low-fat or nonfat varieties of the foods you like to eat or look for substitutes.  You may need to exercise 60 minutes a day to prevent weight gain and 90 minutes a day to lose weight.  RTC in six months.

## 2023-08-09 ENCOUNTER — PES CALL (OUTPATIENT)
Dept: ADMINISTRATIVE | Facility: CLINIC | Age: 63
End: 2023-08-09
Payer: MEDICARE

## 2023-09-08 ENCOUNTER — PATIENT OUTREACH (OUTPATIENT)
Dept: ADMINISTRATIVE | Facility: HOSPITAL | Age: 63
End: 2023-09-08
Payer: MEDICARE

## 2023-09-08 NOTE — PROGRESS NOTES
Chart reviewed, immunization record updated.  No new results noted on [facility] or [facility] web site.  Care Everywhere updated.   Patient care coordination note updated.   Upcoming PCP visit updated.  Next PCP visit 12/08/2023.  LOV with PCP 06/06/2023.   Attempted to contact patient to discuss Colorectal cancer screening.  No answer, left voicemail for patient to return call to clinic.

## 2023-10-09 ENCOUNTER — PATIENT MESSAGE (OUTPATIENT)
Dept: INTERNAL MEDICINE | Facility: CLINIC | Age: 63
End: 2023-10-09
Payer: MEDICARE

## 2023-10-10 ENCOUNTER — OFFICE VISIT (OUTPATIENT)
Dept: INTERNAL MEDICINE | Facility: CLINIC | Age: 63
End: 2023-10-10
Payer: MEDICARE

## 2023-10-10 VITALS
OXYGEN SATURATION: 98 % | RESPIRATION RATE: 16 BRPM | HEART RATE: 77 BPM | SYSTOLIC BLOOD PRESSURE: 176 MMHG | WEIGHT: 185.63 LBS | DIASTOLIC BLOOD PRESSURE: 90 MMHG | HEIGHT: 58 IN | BODY MASS INDEX: 38.97 KG/M2

## 2023-10-10 DIAGNOSIS — Z87.898 HISTORY OF DIARRHEA: ICD-10-CM

## 2023-10-10 DIAGNOSIS — I10 BENIGN ESSENTIAL HTN: ICD-10-CM

## 2023-10-10 DIAGNOSIS — I73.00 RAYNAUD'S DISEASE WITHOUT GANGRENE: ICD-10-CM

## 2023-10-10 DIAGNOSIS — E78.5 HYPERLIPIDEMIA LDL GOAL <70: ICD-10-CM

## 2023-10-10 PROCEDURE — 99214 PR OFFICE/OUTPT VISIT, EST, LEVL IV, 30-39 MIN: ICD-10-PCS | Mod: HCNC,S$GLB,, | Performed by: NURSE PRACTITIONER

## 2023-10-10 PROCEDURE — 3044F PR MOST RECENT HEMOGLOBIN A1C LEVEL <7.0%: ICD-10-PCS | Mod: HCNC,CPTII,S$GLB, | Performed by: NURSE PRACTITIONER

## 2023-10-10 PROCEDURE — 3066F NEPHROPATHY DOC TX: CPT | Mod: HCNC,CPTII,S$GLB, | Performed by: NURSE PRACTITIONER

## 2023-10-10 PROCEDURE — 3080F DIAST BP >= 90 MM HG: CPT | Mod: HCNC,CPTII,S$GLB, | Performed by: NURSE PRACTITIONER

## 2023-10-10 PROCEDURE — 99999 PR PBB SHADOW E&M-EST. PATIENT-LVL IV: ICD-10-PCS | Mod: PBBFAC,HCNC,, | Performed by: NURSE PRACTITIONER

## 2023-10-10 PROCEDURE — 3077F PR MOST RECENT SYSTOLIC BLOOD PRESSURE >= 140 MM HG: ICD-10-PCS | Mod: HCNC,CPTII,S$GLB, | Performed by: NURSE PRACTITIONER

## 2023-10-10 PROCEDURE — 3008F PR BODY MASS INDEX (BMI) DOCUMENTED: ICD-10-PCS | Mod: HCNC,CPTII,S$GLB, | Performed by: NURSE PRACTITIONER

## 2023-10-10 PROCEDURE — 3066F PR DOCUMENTATION OF TREATMENT FOR NEPHROPATHY: ICD-10-PCS | Mod: HCNC,CPTII,S$GLB, | Performed by: NURSE PRACTITIONER

## 2023-10-10 PROCEDURE — 1159F PR MEDICATION LIST DOCUMENTED IN MEDICAL RECORD: ICD-10-PCS | Mod: HCNC,CPTII,S$GLB, | Performed by: NURSE PRACTITIONER

## 2023-10-10 PROCEDURE — 3044F HG A1C LEVEL LT 7.0%: CPT | Mod: HCNC,CPTII,S$GLB, | Performed by: NURSE PRACTITIONER

## 2023-10-10 PROCEDURE — 3008F BODY MASS INDEX DOCD: CPT | Mod: HCNC,CPTII,S$GLB, | Performed by: NURSE PRACTITIONER

## 2023-10-10 PROCEDURE — 3080F PR MOST RECENT DIASTOLIC BLOOD PRESSURE >= 90 MM HG: ICD-10-PCS | Mod: HCNC,CPTII,S$GLB, | Performed by: NURSE PRACTITIONER

## 2023-10-10 PROCEDURE — 1160F RVW MEDS BY RX/DR IN RCRD: CPT | Mod: HCNC,CPTII,S$GLB, | Performed by: NURSE PRACTITIONER

## 2023-10-10 PROCEDURE — 1159F MED LIST DOCD IN RCRD: CPT | Mod: HCNC,CPTII,S$GLB, | Performed by: NURSE PRACTITIONER

## 2023-10-10 PROCEDURE — 99999 PR PBB SHADOW E&M-EST. PATIENT-LVL IV: CPT | Mod: PBBFAC,HCNC,, | Performed by: NURSE PRACTITIONER

## 2023-10-10 PROCEDURE — 1160F PR REVIEW ALL MEDS BY PRESCRIBER/CLIN PHARMACIST DOCUMENTED: ICD-10-PCS | Mod: HCNC,CPTII,S$GLB, | Performed by: NURSE PRACTITIONER

## 2023-10-10 PROCEDURE — 3077F SYST BP >= 140 MM HG: CPT | Mod: HCNC,CPTII,S$GLB, | Performed by: NURSE PRACTITIONER

## 2023-10-10 PROCEDURE — 99214 OFFICE O/P EST MOD 30 MIN: CPT | Mod: HCNC,S$GLB,, | Performed by: NURSE PRACTITIONER

## 2023-10-10 PROCEDURE — 3060F POS MICROALBUMINURIA REV: CPT | Mod: HCNC,CPTII,S$GLB, | Performed by: NURSE PRACTITIONER

## 2023-10-10 PROCEDURE — 3060F PR POS MICROALBUMINURIA RESULT DOCUMENTED/REVIEW: ICD-10-PCS | Mod: HCNC,CPTII,S$GLB, | Performed by: NURSE PRACTITIONER

## 2023-10-10 RX ORDER — AMLODIPINE BESYLATE 5 MG/1
TABLET ORAL
Qty: 180 TABLET | Refills: 1 | Status: SHIPPED | OUTPATIENT
Start: 2023-10-10 | End: 2023-10-16 | Stop reason: SDUPTHER

## 2023-10-10 RX ORDER — DIPHENOXYLATE HYDROCHLORIDE AND ATROPINE SULFATE 2.5; .025 MG/1; MG/1
1 TABLET ORAL 4 TIMES DAILY PRN
Qty: 30 TABLET | Refills: 0 | Status: SHIPPED | OUTPATIENT
Start: 2023-10-10 | End: 2023-10-20

## 2023-10-10 RX ORDER — ROSUVASTATIN CALCIUM 10 MG/1
10 TABLET, COATED ORAL NIGHTLY
Qty: 90 TABLET | Refills: 3 | Status: SHIPPED | OUTPATIENT
Start: 2023-10-10 | End: 2023-10-16 | Stop reason: SDUPTHER

## 2023-10-10 NOTE — PROGRESS NOTES
Subjective:       Patient ID: Lena Zamarripa is a 63 y.o. female.    Chief Complaint: Leg Pain (Bilateral leg cramp. Mostly at night. )    Patient is known, to me and presents with   Chief Complaint   Patient presents with    Leg Pain     Bilateral leg cramp. Mostly at night.    .  Denies chest pain and shortness of breath.  Patient presents for refills and also having some leg cramps at night. She reports that it could be due to several issues. But wanted to let me know. She does suffer with fibromyalgia and also sees pain management. She will wait on further testing. Just wanted to talk with me about it. Otherwise doing well but has not been taking her norvasc so needs that refilled.     Leg Pain   Pertinent negatives include no numbness.     Review of Systems   Constitutional:  Negative for activity change, appetite change, fatigue, fever and unexpected weight change.   HENT:  Negative for congestion, ear discharge, ear pain, hearing loss, postnasal drip and tinnitus.    Eyes:  Negative for photophobia, pain and visual disturbance.   Respiratory:  Negative for cough, shortness of breath, wheezing and stridor.    Cardiovascular:  Negative for chest pain, palpitations and leg swelling.   Gastrointestinal:  Negative for abdominal distention.   Genitourinary:  Negative for difficulty urinating, dysuria, frequency, hematuria and urgency.   Musculoskeletal:  Positive for arthralgias. Negative for back pain, gait problem, joint swelling, myalgias, neck pain and neck stiffness.   Skin: Negative.    Neurological:  Negative for dizziness, seizures, syncope, weakness, light-headedness, numbness and headaches.   Hematological:  Negative for adenopathy. Does not bruise/bleed easily.   Psychiatric/Behavioral:  Negative for behavioral problems, confusion, dysphoric mood, hallucinations, sleep disturbance and suicidal ideas. The patient is not nervous/anxious.        Objective:      Physical Exam  Constitutional:       General:  She is not in acute distress.     Appearance: She is well-developed.   HENT:      Head: Normocephalic and atraumatic.      Right Ear: Tympanic membrane and external ear normal.      Left Ear: Tympanic membrane and external ear normal.      Nose: Nose normal.      Mouth/Throat:      Mouth: Mucous membranes are moist.      Pharynx: No oropharyngeal exudate.   Eyes:      General: No scleral icterus.        Right eye: No discharge.         Left eye: No discharge.      Conjunctiva/sclera: Conjunctivae normal.      Pupils: Pupils are equal, round, and reactive to light.   Neck:      Thyroid: No thyromegaly.      Vascular: No JVD.   Cardiovascular:      Rate and Rhythm: Normal rate and regular rhythm.      Heart sounds: Normal heart sounds. No murmur heard.     No friction rub. No gallop.   Pulmonary:      Effort: Pulmonary effort is normal. No respiratory distress.      Breath sounds: Normal breath sounds. No stridor. No wheezing or rales.   Chest:      Chest wall: No tenderness.   Abdominal:      General: Bowel sounds are normal. There is no distension.      Palpations: Abdomen is soft. There is no mass.      Tenderness: There is no abdominal tenderness. There is no right CVA tenderness, left CVA tenderness, guarding or rebound.      Hernia: No hernia is present.   Musculoskeletal:         General: No swelling, tenderness, deformity or signs of injury.      Cervical back: Normal range of motion and neck supple.      Right lower leg: No edema.      Left lower leg: No edema.   Lymphadenopathy:      Cervical: No cervical adenopathy.   Skin:     General: Skin is warm and dry.      Capillary Refill: Capillary refill takes less than 2 seconds.      Coloration: Skin is not jaundiced or pale.      Findings: No bruising, erythema, lesion or rash.   Neurological:      General: No focal deficit present.      Mental Status: She is alert and oriented to person, place, and time.      Cranial Nerves: No cranial nerve deficit.       "Sensory: No sensory deficit.      Motor: No weakness or abnormal muscle tone.      Coordination: Coordination normal.      Gait: Gait normal.      Deep Tendon Reflexes: Reflexes are normal and symmetric. Reflexes normal.   Psychiatric:         Attention and Perception: Attention and perception normal. She does not perceive auditory or visual hallucinations.         Mood and Affect: Mood and affect normal. Mood is not anxious or depressed. Affect is not tearful.         Speech: Speech normal.         Behavior: Behavior normal. Behavior is cooperative.         Thought Content: Thought content normal. Thought content does not include homicidal or suicidal ideation. Thought content does not include homicidal or suicidal plan.         Cognition and Memory: Cognition and memory normal.         Judgment: Judgment normal.         Assessment:       1. Benign essential HTN    2. Hyperlipidemia LDL goal <70    3. Raynaud's disease without gangrene    4. History of diarrhea        Plan:   1. Benign essential HTN  -     amLODIPine (NORVASC) 5 MG tablet; TAKE 1 TABLET BY MOUTH 2 TIMES DAILY --CALL 3 DAYS EARLY TO REFILL--SPECIAL ORDER--  Dispense: 180 tablet; Refill: 1    2. Hyperlipidemia LDL goal <70  -     rosuvastatin (CRESTOR) 10 MG tablet; Take 1 tablet (10 mg total) by mouth every evening.  Dispense: 90 tablet; Refill: 3    3. Raynaud's disease without gangrene  -     amLODIPine (NORVASC) 5 MG tablet; TAKE 1 TABLET BY MOUTH 2 TIMES DAILY --CALL 3 DAYS EARLY TO REFILL--SPECIAL ORDER--  Dispense: 180 tablet; Refill: 1    4. History of diarrhea  -     diphenoxylate-atropine 2.5-0.025 mg (LOMOTIL) 2.5-0.025 mg per tablet; Take 1 tablet by mouth 4 (four) times daily as needed for Diarrhea.  Dispense: 30 tablet; Refill: 0    "This note will not be shared with the patient."  Wants to wait on tests for LE for now due to financial issues  Scheduled for regular check up in December with blood work   Continue with plan of care and " monitor blood pressure  Refills on meds.  Medication compliance was discussed with the patient.   Medication side effects were discussed.  The patient was instructed on using exercise frequently to reduce blood pressure.  Thirty to forty-five minutes of brisk walking three to four times a week is often helpful to lower your blood pressure.  Monitor blood pressures at home and to record the values in a log.  The patient was instructed to monitor weight closely and to try to keep it as close to ideal body weight as possible.  Reduce salt intake to less than 2 grams per day.  Do not add salt to food at the table.  Reduce or get rid of salt used in cooking.  Limit processed and fast foods.  Read package labels for amount of salt (soduim) in foods.  Losing weight, even just 10 pounds, of can decrease blood pressure.   Rtc as scheduled

## 2023-10-11 ENCOUNTER — PATIENT MESSAGE (OUTPATIENT)
Dept: INTERNAL MEDICINE | Facility: CLINIC | Age: 63
End: 2023-10-11
Payer: MEDICARE

## 2023-10-11 DIAGNOSIS — I73.00 RAYNAUD'S DISEASE WITHOUT GANGRENE: ICD-10-CM

## 2023-10-11 DIAGNOSIS — E78.5 HYPERLIPIDEMIA LDL GOAL <70: ICD-10-CM

## 2023-10-11 DIAGNOSIS — I10 BENIGN ESSENTIAL HTN: ICD-10-CM

## 2023-10-16 RX ORDER — AMLODIPINE BESYLATE 5 MG/1
TABLET ORAL
Qty: 180 TABLET | Refills: 1 | Status: SHIPPED | OUTPATIENT
Start: 2023-10-16

## 2023-10-16 RX ORDER — DIPHENOXYLATE HYDROCHLORIDE AND ATROPINE SULFATE 2.5; .025 MG/1; MG/1
1 TABLET ORAL 4 TIMES DAILY PRN
Qty: 30 TABLET | Refills: 2 | Status: SHIPPED | OUTPATIENT
Start: 2023-10-16 | End: 2023-10-26

## 2023-10-16 RX ORDER — ROSUVASTATIN CALCIUM 10 MG/1
10 TABLET, COATED ORAL NIGHTLY
Qty: 90 TABLET | Refills: 3 | Status: SHIPPED | OUTPATIENT
Start: 2023-10-16 | End: 2023-12-15

## 2023-12-06 ENCOUNTER — PATIENT OUTREACH (OUTPATIENT)
Dept: ADMINISTRATIVE | Facility: HOSPITAL | Age: 63
End: 2023-12-06
Payer: MEDICARE

## 2023-12-15 ENCOUNTER — OFFICE VISIT (OUTPATIENT)
Dept: INTERNAL MEDICINE | Facility: CLINIC | Age: 63
End: 2023-12-15
Payer: MEDICARE

## 2023-12-15 VITALS
HEART RATE: 79 BPM | OXYGEN SATURATION: 99 % | HEIGHT: 58 IN | RESPIRATION RATE: 18 BRPM | WEIGHT: 181.69 LBS | BODY MASS INDEX: 38.14 KG/M2 | DIASTOLIC BLOOD PRESSURE: 88 MMHG | SYSTOLIC BLOOD PRESSURE: 138 MMHG

## 2023-12-15 DIAGNOSIS — M32.8 OTHER FORMS OF SYSTEMIC LUPUS ERYTHEMATOSUS, UNSPECIFIED ORGAN INVOLVEMENT STATUS: ICD-10-CM

## 2023-12-15 DIAGNOSIS — R73.03 PREDIABETES: ICD-10-CM

## 2023-12-15 DIAGNOSIS — E78.5 HYPERLIPIDEMIA LDL GOAL <70: ICD-10-CM

## 2023-12-15 DIAGNOSIS — J20.8 ACUTE BACTERIAL BRONCHITIS: ICD-10-CM

## 2023-12-15 DIAGNOSIS — B96.89 ACUTE BACTERIAL BRONCHITIS: ICD-10-CM

## 2023-12-15 DIAGNOSIS — I10 BENIGN ESSENTIAL HTN: Primary | ICD-10-CM

## 2023-12-15 DIAGNOSIS — E66.01 SEVERE OBESITY (BMI 35.0-39.9) WITH COMORBIDITY: ICD-10-CM

## 2023-12-15 LAB
ALBUMIN SERPL-MCNC: 4.5 G/DL (ref 3.9–4.9)
ALBUMIN/CREAT UR: 37 MG/G CREAT (ref 0–29)
ALBUMIN/GLOB SERPL: 2 {RATIO} (ref 1.2–2.2)
ALP SERPL-CCNC: 142 IU/L (ref 44–121)
ALT SERPL-CCNC: 46 IU/L (ref 0–32)
AST SERPL-CCNC: 43 IU/L (ref 0–40)
BASOPHILS # BLD AUTO: 0 X10E3/UL (ref 0–0.2)
BASOPHILS NFR BLD AUTO: 1 %
BILIRUB SERPL-MCNC: <0.2 MG/DL (ref 0–1.2)
BUN SERPL-MCNC: 11 MG/DL (ref 8–27)
BUN/CREAT SERPL: 16 (ref 12–28)
CALCIUM SERPL-MCNC: 9.1 MG/DL (ref 8.7–10.3)
CHLORIDE SERPL-SCNC: 98 MMOL/L (ref 96–106)
CHOLEST SERPL-MCNC: 117 MG/DL (ref 100–199)
CO2 SERPL-SCNC: 21 MMOL/L (ref 20–29)
CREAT SERPL-MCNC: 0.68 MG/DL (ref 0.57–1)
CREAT UR-MCNC: 141.6 MG/DL
EOSINOPHIL # BLD AUTO: 0 X10E3/UL (ref 0–0.4)
EOSINOPHIL NFR BLD AUTO: 0 %
ERYTHROCYTE [DISTWIDTH] IN BLOOD BY AUTOMATED COUNT: 13.2 % (ref 11.7–15.4)
EST. GFR  (NO RACE VARIABLE): 97 ML/MIN/1.73
GLOBULIN SER CALC-MCNC: 2.3 G/DL (ref 1.5–4.5)
GLUCOSE SERPL-MCNC: ABNORMAL MG/DL
HBA1C MFR BLD: 6.3 % (ref 4.8–5.6)
HCT VFR BLD AUTO: 48.9 % (ref 34–46.6)
HCV IGG SERPL QL IA: NON REACTIVE
HDLC SERPL-MCNC: 27 MG/DL
HGB BLD-MCNC: 15.4 G/DL (ref 11.1–15.9)
IMM GRANULOCYTES # BLD AUTO: 0 X10E3/UL (ref 0–0.1)
IMM GRANULOCYTES NFR BLD AUTO: 0 %
LDLC SERPL CALC-MCNC: 68 MG/DL (ref 0–99)
LYMPHOCYTES # BLD AUTO: 2.7 X10E3/UL (ref 0.7–3.1)
LYMPHOCYTES NFR BLD AUTO: 42 %
MCH RBC QN AUTO: 26.2 PG (ref 26.6–33)
MCHC RBC AUTO-ENTMCNC: 31.5 G/DL (ref 31.5–35.7)
MCV RBC AUTO: 83 FL (ref 79–97)
MICROALBUMIN UR-MCNC: 52.3 UG/ML
MONOCYTES # BLD AUTO: 1 X10E3/UL (ref 0.1–0.9)
MONOCYTES NFR BLD AUTO: 15 %
NEUTROPHILS # BLD AUTO: 2.8 X10E3/UL (ref 1.4–7)
NEUTROPHILS NFR BLD AUTO: 42 %
PLATELET # BLD AUTO: 279 X10E3/UL (ref 150–450)
POTASSIUM SERPL-SCNC: ABNORMAL MMOL/L
PROT SERPL-MCNC: 6.8 G/DL (ref 6–8.5)
RBC # BLD AUTO: 5.87 X10E6/UL (ref 3.77–5.28)
SODIUM SERPL-SCNC: 140 MMOL/L (ref 134–144)
SPECIMEN STATUS REPORT: NORMAL
TRIGL SERPL-MCNC: 118 MG/DL (ref 0–149)
TSH SERPL DL<=0.005 MIU/L-ACNC: 1.31 UIU/ML (ref 0.45–4.5)
VLDLC SERPL CALC-MCNC: 22 MG/DL (ref 5–40)
WBC # BLD AUTO: 6.5 X10E3/UL (ref 3.4–10.8)

## 2023-12-15 PROCEDURE — 3008F BODY MASS INDEX DOCD: CPT | Mod: HCNC,CPTII,S$GLB, | Performed by: NURSE PRACTITIONER

## 2023-12-15 PROCEDURE — 99999 PR PBB SHADOW E&M-EST. PATIENT-LVL IV: ICD-10-PCS | Mod: PBBFAC,HCNC,, | Performed by: NURSE PRACTITIONER

## 2023-12-15 PROCEDURE — 3066F NEPHROPATHY DOC TX: CPT | Mod: HCNC,CPTII,S$GLB, | Performed by: NURSE PRACTITIONER

## 2023-12-15 PROCEDURE — 99214 OFFICE O/P EST MOD 30 MIN: CPT | Mod: HCNC,25,S$GLB, | Performed by: NURSE PRACTITIONER

## 2023-12-15 PROCEDURE — 3044F PR MOST RECENT HEMOGLOBIN A1C LEVEL <7.0%: ICD-10-PCS | Mod: HCNC,CPTII,S$GLB, | Performed by: NURSE PRACTITIONER

## 2023-12-15 PROCEDURE — 3044F HG A1C LEVEL LT 7.0%: CPT | Mod: HCNC,CPTII,S$GLB, | Performed by: NURSE PRACTITIONER

## 2023-12-15 PROCEDURE — 3075F PR MOST RECENT SYSTOLIC BLOOD PRESS GE 130-139MM HG: ICD-10-PCS | Mod: HCNC,CPTII,S$GLB, | Performed by: NURSE PRACTITIONER

## 2023-12-15 PROCEDURE — 96372 PR INJECTION,THERAP/PROPH/DIAG2ST, IM OR SUBCUT: ICD-10-PCS | Mod: HCNC,S$GLB,, | Performed by: NURSE PRACTITIONER

## 2023-12-15 PROCEDURE — 96372 THER/PROPH/DIAG INJ SC/IM: CPT | Mod: HCNC,S$GLB,, | Performed by: NURSE PRACTITIONER

## 2023-12-15 PROCEDURE — 3066F PR DOCUMENTATION OF TREATMENT FOR NEPHROPATHY: ICD-10-PCS | Mod: HCNC,CPTII,S$GLB, | Performed by: NURSE PRACTITIONER

## 2023-12-15 PROCEDURE — 1159F MED LIST DOCD IN RCRD: CPT | Mod: HCNC,CPTII,S$GLB, | Performed by: NURSE PRACTITIONER

## 2023-12-15 PROCEDURE — 99214 PR OFFICE/OUTPT VISIT, EST, LEVL IV, 30-39 MIN: ICD-10-PCS | Mod: HCNC,25,S$GLB, | Performed by: NURSE PRACTITIONER

## 2023-12-15 PROCEDURE — 3079F DIAST BP 80-89 MM HG: CPT | Mod: HCNC,CPTII,S$GLB, | Performed by: NURSE PRACTITIONER

## 2023-12-15 PROCEDURE — 99999 PR PBB SHADOW E&M-EST. PATIENT-LVL IV: CPT | Mod: PBBFAC,HCNC,, | Performed by: NURSE PRACTITIONER

## 2023-12-15 PROCEDURE — 3060F PR POS MICROALBUMINURIA RESULT DOCUMENTED/REVIEW: ICD-10-PCS | Mod: HCNC,CPTII,S$GLB, | Performed by: NURSE PRACTITIONER

## 2023-12-15 PROCEDURE — 3079F PR MOST RECENT DIASTOLIC BLOOD PRESSURE 80-89 MM HG: ICD-10-PCS | Mod: HCNC,CPTII,S$GLB, | Performed by: NURSE PRACTITIONER

## 2023-12-15 PROCEDURE — 3008F PR BODY MASS INDEX (BMI) DOCUMENTED: ICD-10-PCS | Mod: HCNC,CPTII,S$GLB, | Performed by: NURSE PRACTITIONER

## 2023-12-15 PROCEDURE — 3075F SYST BP GE 130 - 139MM HG: CPT | Mod: HCNC,CPTII,S$GLB, | Performed by: NURSE PRACTITIONER

## 2023-12-15 PROCEDURE — 1159F PR MEDICATION LIST DOCUMENTED IN MEDICAL RECORD: ICD-10-PCS | Mod: HCNC,CPTII,S$GLB, | Performed by: NURSE PRACTITIONER

## 2023-12-15 PROCEDURE — 3060F POS MICROALBUMINURIA REV: CPT | Mod: HCNC,CPTII,S$GLB, | Performed by: NURSE PRACTITIONER

## 2023-12-15 RX ORDER — TRIAMCINOLONE ACETONIDE 40 MG/ML
40 INJECTION, SUSPENSION INTRA-ARTICULAR; INTRAMUSCULAR
Status: COMPLETED | OUTPATIENT
Start: 2023-12-15 | End: 2023-12-15

## 2023-12-15 RX ORDER — AZITHROMYCIN 250 MG/1
TABLET, FILM COATED ORAL
Qty: 6 TABLET | Refills: 0 | Status: SHIPPED | OUTPATIENT
Start: 2023-12-15 | End: 2023-12-20

## 2023-12-15 RX ADMIN — TRIAMCINOLONE ACETONIDE 40 MG: 40 INJECTION, SUSPENSION INTRA-ARTICULAR; INTRAMUSCULAR at 01:12

## 2023-12-15 NOTE — PROGRESS NOTES
Subjective:       Patient ID: Lena Zamarripa is a 63 y.o. female.    Chief Complaint: Follow-up (X 6 months/)    Patient is known, to me and presents with   Chief Complaint   Patient presents with    Follow-up     X 6 months     .  Denies chest pain and shortness of breath.  Patient presents with six month check up and lab work review. States that she is recovering from the flu and has a horrible cough. She normally does a steroid shot and needs antibx. Otherwise she is doing ok.   Follow-up  Associated symptoms include arthralgias and coughing. Pertinent negatives include no chest pain, congestion, fatigue, fever, headaches, joint swelling, neck pain, numbness or weakness.     Review of Systems   Constitutional:  Negative for activity change, appetite change, fatigue, fever and unexpected weight change.   HENT:  Negative for congestion, ear discharge, ear pain, hearing loss, postnasal drip and tinnitus.    Eyes:  Negative for photophobia, pain and visual disturbance.   Respiratory:  Positive for cough and wheezing. Negative for shortness of breath and stridor.    Cardiovascular:  Negative for chest pain, palpitations and leg swelling.   Gastrointestinal:  Negative for abdominal distention.   Genitourinary:  Negative for difficulty urinating, dysuria, frequency, hematuria and urgency.   Musculoskeletal:  Positive for arthralgias. Negative for back pain, gait problem, joint swelling and neck pain.   Skin: Negative.    Neurological:  Negative for dizziness, seizures, syncope, weakness, light-headedness, numbness and headaches.   Hematological:  Negative for adenopathy. Does not bruise/bleed easily.   Psychiatric/Behavioral:  Negative for behavioral problems, confusion, dysphoric mood, hallucinations, sleep disturbance and suicidal ideas. The patient is not nervous/anxious.        Objective:      Physical Exam  Constitutional:       General: She is not in acute distress.     Appearance: She is well-developed.   HENT:       Head: Normocephalic and atraumatic.      Right Ear: Tympanic membrane and external ear normal.      Left Ear: Tympanic membrane and external ear normal.      Nose: Nose normal.      Mouth/Throat:      Mouth: Mucous membranes are moist.      Pharynx: No oropharyngeal exudate.   Eyes:      General: No scleral icterus.        Right eye: No discharge.         Left eye: No discharge.      Conjunctiva/sclera: Conjunctivae normal.      Pupils: Pupils are equal, round, and reactive to light.   Neck:      Thyroid: No thyromegaly.      Vascular: No JVD.   Cardiovascular:      Rate and Rhythm: Normal rate and regular rhythm.      Heart sounds: Normal heart sounds. No murmur heard.     No friction rub. No gallop.   Pulmonary:      Effort: Pulmonary effort is normal. No respiratory distress.      Breath sounds: No stridor. Examination of the right-upper field reveals wheezing and rhonchi. Examination of the left-upper field reveals wheezing and rhonchi. Examination of the right-middle field reveals wheezing and rhonchi. Examination of the left-middle field reveals wheezing and rhonchi. Examination of the right-lower field reveals wheezing and rhonchi. Examination of the left-lower field reveals wheezing and rhonchi. Wheezing, rhonchi and rales present.   Chest:      Chest wall: No tenderness.   Abdominal:      General: Bowel sounds are normal. There is no distension.      Palpations: Abdomen is soft. There is no mass.      Tenderness: There is no abdominal tenderness. There is no right CVA tenderness, left CVA tenderness, guarding or rebound.      Hernia: No hernia is present.   Musculoskeletal:         General: No swelling, tenderness, deformity or signs of injury.      Cervical back: Normal range of motion and neck supple.      Right lower leg: No edema.      Left lower leg: No edema.   Lymphadenopathy:      Cervical: No cervical adenopathy.   Skin:     General: Skin is warm and dry.      Capillary Refill: Capillary  "refill takes less than 2 seconds.      Coloration: Skin is not jaundiced or pale.      Findings: No bruising, erythema, lesion or rash.   Neurological:      General: No focal deficit present.      Mental Status: She is alert and oriented to person, place, and time.      Cranial Nerves: No cranial nerve deficit.      Sensory: No sensory deficit.      Motor: No weakness or abnormal muscle tone.      Coordination: Coordination normal.      Gait: Gait normal.      Deep Tendon Reflexes: Reflexes are normal and symmetric. Reflexes normal.   Psychiatric:         Attention and Perception: She does not perceive auditory or visual hallucinations.         Mood and Affect: Mood and affect normal. Mood is not anxious or depressed. Affect is not tearful.         Behavior: Behavior normal.         Thought Content: Thought content normal. Thought content does not include homicidal or suicidal ideation. Thought content does not include homicidal or suicidal plan.         Judgment: Judgment normal.         Assessment:       1. Benign essential HTN    2. Hyperlipidemia LDL goal <70    3. Other forms of systemic lupus erythematosus, unspecified organ involvement status    4. Prediabetes    5. Severe obesity (BMI 35.0-39.9) with comorbidity    6. Acute bacterial bronchitis        Plan:   1. Benign essential HTN    2. Hyperlipidemia LDL goal <70    3. Other forms of systemic lupus erythematosus, unspecified organ involvement status    4. Prediabetes    5. Severe obesity (BMI 35.0-39.9) with comorbidity    6. Acute bacterial bronchitis  -     azithromycin (Z-KASSANDRA) 250 MG tablet; Take 2 tablets by mouth on day 1; Take 1 tablet by mouth on days 2-5  Dispense: 6 tablet; Refill: 0  -     triamcinolone acetonide injection 40 mg       "This note will not be shared with the patient."  Continue with plan of care  She has a nebulizer at home   If no improvement she will let me know  Refills on meds.  Medication compliance was discussed with the " patient.   Medication side effects were discussed.  The patient was instructed on using exercise frequently to reduce blood pressure.  Thirty to forty-five minutes of brisk walking three to four times a week is often helpful to lower your blood pressure.  Monitor blood pressures at home and to record the values in a log.  The patient was instructed to monitor weight closely and to try to keep it as close to ideal body weight as possible.  Reduce salt intake to less than 2 grams per day.  Do not add salt to food at the table.  Reduce or get rid of salt used in cooking.  Limit processed and fast foods.  Read package labels for amount of salt (soduim) in foods.  Losing weight, even just 10 pounds, of can decrease blood pressure.   Rtc as scheduled

## 2024-01-04 ENCOUNTER — PATIENT MESSAGE (OUTPATIENT)
Dept: INTERNAL MEDICINE | Facility: CLINIC | Age: 64
End: 2024-01-04
Payer: MEDICARE

## 2024-01-26 ENCOUNTER — PATIENT OUTREACH (OUTPATIENT)
Dept: ADMINISTRATIVE | Facility: HOSPITAL | Age: 64
End: 2024-01-26
Payer: MEDICARE

## 2024-01-26 NOTE — PROGRESS NOTES
Population Health Chart Review & Patient Outreach Details    Outreach Performed: NO  Chart Reviewed: YES    Additional Pop Health Notes:    Attempted to contact patient to discuss Colorectal Cancer screening and Enhanced Annual Wellness Visit.   No answer, left voicemail for patient to return call to clinic.           Updates Requested / Reviewed:      Updated Care Coordination Note, Care Everywhere, , External Sources: [facility] and [facility], Care Team Updated, Removed  or Duplicate Orders, and Immunizations Reconciliation Completed or Queried: [place]         Health Maintenance Topics Overdue:    Health Maintenance Due   Topic Date Due    COVID-19 Vaccine (1) Never done    HIV Screening  Never done    TETANUS VACCINE  Never done    Shingles Vaccine (1 of 2) Never done    RSV Vaccine (Age 60+ and Pregnant patients) (1 - 1-dose 60+ series) Never done    Influenza Vaccine (1) Never done    Colorectal Cancer Screening  12/15/2023         Health Maintenance Topic(s) Outreach Outcomes & Actions Taken:    Colorectal Cancer Screening - Outreach Outcomes & Actions Taken  : No answer, left voicemail for patient to return call to clinic.     Enhanced Annual Wellness Visit: No answer, left voicemail for patient to return call to clinic.

## 2024-01-30 ENCOUNTER — PATIENT OUTREACH (OUTPATIENT)
Dept: ADMINISTRATIVE | Facility: HOSPITAL | Age: 64
End: 2024-01-30
Payer: MEDICARE

## 2024-05-06 DIAGNOSIS — I73.00 RAYNAUD'S DISEASE WITHOUT GANGRENE: ICD-10-CM

## 2024-05-06 DIAGNOSIS — I10 BENIGN ESSENTIAL HTN: ICD-10-CM

## 2024-05-06 RX ORDER — AMLODIPINE BESYLATE 5 MG/1
TABLET ORAL
Qty: 180 TABLET | Refills: 1 | Status: SHIPPED | OUTPATIENT
Start: 2024-05-06

## 2024-07-12 ENCOUNTER — PATIENT OUTREACH (OUTPATIENT)
Dept: ADMINISTRATIVE | Facility: HOSPITAL | Age: 64
End: 2024-07-12
Payer: MEDICARE

## 2024-07-12 NOTE — PROGRESS NOTES
[facility], Colorectal Cancer Screening.  Chart reviewed, immunization record updated.  No new results noted on [facility] or [facility] web site.  Care Everywhere updated.   Patient care coordination note  LOV with PCP 12/15/2023.  Left detailed message for patient to return call to discuss Colorectal Cancer Screening and schedule eAWV.

## 2024-07-25 ENCOUNTER — PATIENT MESSAGE (OUTPATIENT)
Dept: ADMINISTRATIVE | Facility: HOSPITAL | Age: 64
End: 2024-07-25
Payer: MEDICARE

## 2024-07-26 DIAGNOSIS — Z12.11 SCREENING FOR COLON CANCER: ICD-10-CM

## 2024-09-20 DIAGNOSIS — B96.89 ACUTE BACTERIAL BRONCHITIS: ICD-10-CM

## 2024-09-20 DIAGNOSIS — J20.8 ACUTE BACTERIAL BRONCHITIS: ICD-10-CM

## 2024-09-20 RX ORDER — AZITHROMYCIN 250 MG/1
TABLET, FILM COATED ORAL
Qty: 6 TABLET | Refills: 0 | Status: SHIPPED | OUTPATIENT
Start: 2024-09-20

## 2024-09-22 DIAGNOSIS — B96.89 ACUTE BACTERIAL BRONCHITIS: ICD-10-CM

## 2024-09-22 DIAGNOSIS — J20.8 ACUTE BACTERIAL BRONCHITIS: ICD-10-CM

## 2024-09-23 RX ORDER — AZITHROMYCIN 250 MG/1
TABLET, FILM COATED ORAL
Qty: 6 TABLET | Refills: 0 | Status: SHIPPED | OUTPATIENT
Start: 2024-09-23

## 2024-10-02 DIAGNOSIS — B96.89 ACUTE BACTERIAL BRONCHITIS: ICD-10-CM

## 2024-10-02 DIAGNOSIS — J20.8 ACUTE BACTERIAL BRONCHITIS: ICD-10-CM

## 2024-10-03 RX ORDER — AZITHROMYCIN 250 MG/1
TABLET, FILM COATED ORAL
Qty: 6 TABLET | Refills: 0 | Status: SHIPPED | OUTPATIENT
Start: 2024-10-03

## 2024-10-15 DIAGNOSIS — B96.89 ACUTE BACTERIAL BRONCHITIS: ICD-10-CM

## 2024-10-15 DIAGNOSIS — J20.8 ACUTE BACTERIAL BRONCHITIS: ICD-10-CM

## 2024-10-15 RX ORDER — AZITHROMYCIN 250 MG/1
TABLET, FILM COATED ORAL
Qty: 6 TABLET | Refills: 0 | Status: SHIPPED | OUTPATIENT
Start: 2024-10-15 | End: 2024-10-17

## 2024-10-16 ENCOUNTER — PATIENT MESSAGE (OUTPATIENT)
Dept: INTERNAL MEDICINE | Facility: CLINIC | Age: 64
End: 2024-10-16
Payer: MEDICARE

## 2024-10-16 ENCOUNTER — TELEPHONE (OUTPATIENT)
Dept: INTERNAL MEDICINE | Facility: CLINIC | Age: 64
End: 2024-10-16
Payer: MEDICARE

## 2024-10-16 DIAGNOSIS — R73.03 PREDIABETES: ICD-10-CM

## 2024-10-16 DIAGNOSIS — E78.5 HYPERLIPIDEMIA LDL GOAL <70: ICD-10-CM

## 2024-10-16 DIAGNOSIS — I10 BENIGN ESSENTIAL HTN: Primary | ICD-10-CM

## 2024-10-16 NOTE — TELEPHONE ENCOUNTER
Pt called requesting if you could please [facility] She will try to go tomorrow morning for her routine BW before her appt seeing you at 11am  Please advise  Thanks!

## 2024-10-17 ENCOUNTER — OFFICE VISIT (OUTPATIENT)
Dept: INTERNAL MEDICINE | Facility: CLINIC | Age: 64
End: 2024-10-17
Payer: MEDICARE

## 2024-10-17 VITALS
HEART RATE: 67 BPM | DIASTOLIC BLOOD PRESSURE: 90 MMHG | HEIGHT: 58 IN | OXYGEN SATURATION: 96 % | BODY MASS INDEX: 37.66 KG/M2 | SYSTOLIC BLOOD PRESSURE: 130 MMHG | WEIGHT: 179.44 LBS | RESPIRATION RATE: 20 BRPM

## 2024-10-17 DIAGNOSIS — E78.5 HYPERLIPIDEMIA LDL GOAL <70: ICD-10-CM

## 2024-10-17 DIAGNOSIS — Z12.39 ENCOUNTER FOR SCREENING FOR MALIGNANT NEOPLASM OF BREAST, UNSPECIFIED SCREENING MODALITY: ICD-10-CM

## 2024-10-17 DIAGNOSIS — I10 BENIGN ESSENTIAL HTN: Primary | ICD-10-CM

## 2024-10-17 DIAGNOSIS — E66.01 SEVERE OBESITY (BMI 35.0-39.9) WITH COMORBIDITY: ICD-10-CM

## 2024-10-17 DIAGNOSIS — M32.8 OTHER FORMS OF SYSTEMIC LUPUS ERYTHEMATOSUS, UNSPECIFIED ORGAN INVOLVEMENT STATUS: ICD-10-CM

## 2024-10-17 DIAGNOSIS — F11.29 OPIOID DEPENDENCE WITH OPIOID-INDUCED DISORDER: ICD-10-CM

## 2024-10-17 DIAGNOSIS — R73.03 PREDIABETES: ICD-10-CM

## 2024-10-17 PROCEDURE — 3075F SYST BP GE 130 - 139MM HG: CPT | Mod: HCNC,CPTII,S$GLB, | Performed by: NURSE PRACTITIONER

## 2024-10-17 PROCEDURE — 3288F FALL RISK ASSESSMENT DOCD: CPT | Mod: HCNC,CPTII,S$GLB, | Performed by: NURSE PRACTITIONER

## 2024-10-17 PROCEDURE — 1159F MED LIST DOCD IN RCRD: CPT | Mod: HCNC,CPTII,S$GLB, | Performed by: NURSE PRACTITIONER

## 2024-10-17 PROCEDURE — 3080F DIAST BP >= 90 MM HG: CPT | Mod: HCNC,CPTII,S$GLB, | Performed by: NURSE PRACTITIONER

## 2024-10-17 PROCEDURE — 99999 PR PBB SHADOW E&M-EST. PATIENT-LVL IV: CPT | Mod: PBBFAC,HCNC,, | Performed by: NURSE PRACTITIONER

## 2024-10-17 PROCEDURE — 3008F BODY MASS INDEX DOCD: CPT | Mod: HCNC,CPTII,S$GLB, | Performed by: NURSE PRACTITIONER

## 2024-10-17 PROCEDURE — 99214 OFFICE O/P EST MOD 30 MIN: CPT | Mod: HCNC,S$GLB,, | Performed by: NURSE PRACTITIONER

## 2024-10-17 PROCEDURE — 1160F RVW MEDS BY RX/DR IN RCRD: CPT | Mod: HCNC,CPTII,S$GLB, | Performed by: NURSE PRACTITIONER

## 2024-10-17 PROCEDURE — 1101F PT FALLS ASSESS-DOCD LE1/YR: CPT | Mod: HCNC,CPTII,S$GLB, | Performed by: NURSE PRACTITIONER

## 2024-10-17 RX ORDER — LISINOPRIL 20 MG/1
20 TABLET ORAL DAILY
Qty: 90 TABLET | Refills: 3 | Status: SHIPPED | OUTPATIENT
Start: 2024-10-17 | End: 2025-10-17

## 2024-10-17 RX ORDER — AMLODIPINE BESYLATE 10 MG/1
10 TABLET ORAL NIGHTLY
Qty: 90 TABLET | Refills: 3 | Status: SHIPPED | OUTPATIENT
Start: 2024-10-17 | End: 2025-10-17

## 2024-10-17 RX ORDER — ROSUVASTATIN CALCIUM 10 MG/1
10 TABLET, COATED ORAL NIGHTLY
Qty: 90 TABLET | Refills: 3 | Status: SHIPPED | OUTPATIENT
Start: 2024-10-17 | End: 2024-11-16

## 2024-10-17 NOTE — PROGRESS NOTES
Subjective:       Patient ID: Lena Zamarripa is a 64 y.o. female.    Chief Complaint: Follow-up (Check up - )    Patient is known, to me and presents with   Chief Complaint   Patient presents with    Follow-up     Check up -    .  Denies chest pain and shortness of breath.  Patient presents with check up and will have labs done at [facility]. She is doing ok and no complaints today.       Follow-up  Associated symptoms include arthralgias and neck pain. Pertinent negatives include no chest pain, congestion, coughing, fatigue, fever, headaches, joint swelling, numbness, vomiting or weakness.     Review of Systems   Constitutional:  Negative for activity change, appetite change, fatigue, fever and unexpected weight change.   HENT:  Negative for congestion, ear discharge, ear pain, hearing loss, postnasal drip, rhinorrhea, tinnitus and trouble swallowing.    Eyes:  Negative for photophobia, pain, discharge and visual disturbance.   Respiratory:  Negative for cough, chest tightness, shortness of breath, wheezing and stridor.    Cardiovascular:  Negative for chest pain, palpitations and leg swelling.   Gastrointestinal:  Negative for abdominal distention, blood in stool, constipation, diarrhea and vomiting.   Endocrine: Negative for polydipsia and polyuria.   Genitourinary:  Negative for difficulty urinating, dysuria, frequency, hematuria, menstrual problem and urgency.   Musculoskeletal:  Positive for arthralgias and neck pain. Negative for back pain, gait problem and joint swelling.   Skin: Negative.    Neurological:  Negative for dizziness, seizures, syncope, weakness, light-headedness, numbness and headaches.   Hematological:  Negative for adenopathy. Does not bruise/bleed easily.   Psychiatric/Behavioral:  Negative for behavioral problems, confusion, dysphoric mood, hallucinations, sleep disturbance and suicidal ideas. The patient is not nervous/anxious.        Objective:      Physical Exam  Constitutional:        General: She is not in acute distress.     Appearance: She is well-developed.   HENT:      Head: Normocephalic and atraumatic.      Right Ear: Tympanic membrane and external ear normal.      Left Ear: Tympanic membrane and external ear normal.      Nose: Nose normal.      Mouth/Throat:      Mouth: Mucous membranes are moist.      Pharynx: No oropharyngeal exudate.   Eyes:      General: No scleral icterus.        Right eye: No discharge.         Left eye: No discharge.      Conjunctiva/sclera: Conjunctivae normal.      Pupils: Pupils are equal, round, and reactive to light.   Neck:      Thyroid: No thyromegaly.      Vascular: No JVD.   Cardiovascular:      Rate and Rhythm: Normal rate and regular rhythm.      Heart sounds: Normal heart sounds. No murmur heard.     No friction rub. No gallop.   Pulmonary:      Effort: Pulmonary effort is normal. No respiratory distress.      Breath sounds: Normal breath sounds. No stridor. No wheezing, rhonchi or rales.   Chest:      Chest wall: No tenderness.   Abdominal:      General: Bowel sounds are normal. There is no distension.      Palpations: Abdomen is soft. There is no mass.      Tenderness: There is no abdominal tenderness. There is no right CVA tenderness, left CVA tenderness, guarding or rebound.      Hernia: No hernia is present.   Musculoskeletal:         General: No swelling, tenderness, deformity or signs of injury.      Cervical back: Normal range of motion and neck supple.      Right lower leg: No edema.      Left lower leg: No edema.   Lymphadenopathy:      Cervical: No cervical adenopathy.   Skin:     General: Skin is warm and dry.      Capillary Refill: Capillary refill takes less than 2 seconds.      Coloration: Skin is not jaundiced or pale.      Findings: No bruising, erythema, lesion or rash.   Neurological:      General: No focal deficit present.      Mental Status: She is alert and oriented to person, place, and time.      Cranial Nerves: No cranial nerve  deficit.      Sensory: No sensory deficit.      Motor: No weakness or abnormal muscle tone.      Coordination: Coordination normal.      Gait: Gait normal.      Deep Tendon Reflexes: Reflexes are normal and symmetric. Reflexes normal.   Psychiatric:         Attention and Perception: She does not perceive auditory or visual hallucinations.         Mood and Affect: Mood and affect normal. Mood is not anxious or depressed. Affect is not tearful.         Behavior: Behavior normal.         Thought Content: Thought content normal. Thought content does not include homicidal or suicidal ideation. Thought content does not include homicidal or suicidal plan.         Judgment: Judgment normal.         Assessment:       1. Benign essential HTN    2. Hyperlipidemia LDL goal <70    3. Prediabetes    4. Other forms of systemic lupus erythematosus, unspecified organ involvement status    5. Opioid dependence with opioid-induced disorder    6. Severe obesity (BMI 35.0-39.9) with comorbidity    7. Encounter for screening for malignant neoplasm of breast, unspecified screening modality        Plan:   1. Benign essential HTN  -     lisinopriL (PRINIVIL,ZESTRIL) 20 MG tablet; Take 1 tablet (20 mg total) by mouth once daily.  Dispense: 90 tablet; Refill: 3  -     amLODIPine (NORVASC) 10 MG tablet; Take 1 tablet (10 mg total) by mouth nightly.  Dispense: 90 tablet; Refill: 3    2. Hyperlipidemia LDL goal <70  -     rosuvastatin (CRESTOR) 10 MG tablet; Take 1 tablet (10 mg total) by mouth every evening.  Dispense: 90 tablet; Refill: 3    3. Prediabetes    4. Other forms of systemic lupus erythematosus, unspecified organ involvement status  Assessment & Plan:  Stable at this time      5. Opioid dependence with opioid-induced disorder  Assessment & Plan:  Stable and follows up with her pain management doctor       6. Severe obesity (BMI 35.0-39.9) with comorbidity  Assessment & Plan:  Weight is stable       7. Encounter for screening for  "malignant neoplasm of breast, unspecified screening modality  -     Mammo Digital Screening Bilat w/ [facility]; Future; Expected date: 10/17/2024       "This note will not be shared with the patient."      Lab drawn today CBC, CMP, TSH, FLP  Limit the cholesterol in your diet to less than 300 mg per day.  Fats should contribute no more than 20 to 35% of your daily calories.  Less than 7 to 10% of your calories should come from saturated fat.  Avoid saturated fat products e.g., butter, some oils, meat, and poultry fat contain a lot of saturated fat.  Check food labels for fat and cholesterol content. Choose the foods with less fat per serving.  Limit the amount of butter and margarine you eat.  Use salad dressings and margarine made with polyunsaturated and monunsaturated fats.  Use egg whites or egg substitutes rather than whole eggs.  Replace whole-milk dairy products with nonfat or low-fat mild, cheese, spreads, and yogurt.  Eat skinless chicken, turkey, fish, and meatless entrees more often than red meat.  Choose lean cuts of meat and trim off all visible fat. Keep portion sizes moderate.  Avoid fatty desserts such as ice cream, cream-filled cakes, and cheesecakes. Choose fresh fruits, nonfat frozen yogurt, Popsicles, etc.  Reduce the amount of fried foods, vending machine food, and fast food you eat.  Eat fruits and vegetables (especially fresh fruits and leafy vegetables), beans, and whole grains daily. The fiber in these foods helps lower cholesterol.  Look for low-fat or nonfat varieties of the foods you like to eat or look for substitutes.  You may need to exercise 60 minutes a day to prevent weight gain and 90 minutes a day to lose weight.  RTC in six months.   "

## 2024-10-18 DIAGNOSIS — J20.8 ACUTE BACTERIAL BRONCHITIS: ICD-10-CM

## 2024-10-18 DIAGNOSIS — B96.89 ACUTE BACTERIAL BRONCHITIS: ICD-10-CM

## 2024-10-18 RX ORDER — AZITHROMYCIN 250 MG/1
TABLET, FILM COATED ORAL
Qty: 6 TABLET | Refills: 0 | Status: SHIPPED | OUTPATIENT
Start: 2024-10-18

## 2024-10-19 LAB
ALBUMIN SERPL-MCNC: 4.3 G/DL (ref 3.9–4.9)
ALBUMIN/CREAT UR: 9 MG/G CREAT (ref 0–29)
ALP SERPL-CCNC: 155 IU/L (ref 44–121)
ALT SERPL-CCNC: 46 IU/L (ref 0–32)
AST SERPL-CCNC: 83 IU/L (ref 0–40)
BASOPHILS # BLD AUTO: 0.1 X10E3/UL (ref 0–0.2)
BASOPHILS NFR BLD AUTO: 1 %
BILIRUB SERPL-MCNC: 0.3 MG/DL (ref 0–1.2)
BUN SERPL-MCNC: 19 MG/DL (ref 8–27)
BUN/CREAT SERPL: 16 (ref 12–28)
CALCIUM SERPL-MCNC: 9.8 MG/DL (ref 8.7–10.3)
CHLORIDE SERPL-SCNC: 105 MMOL/L (ref 96–106)
CHOLEST SERPL-MCNC: 137 MG/DL (ref 100–199)
CO2 SERPL-SCNC: 25 MMOL/L (ref 20–29)
CREAT SERPL-MCNC: 1.18 MG/DL (ref 0.57–1)
CREAT UR-MCNC: 155.4 MG/DL
EOSINOPHIL # BLD AUTO: 0.3 X10E3/UL (ref 0–0.4)
EOSINOPHIL NFR BLD AUTO: 3 %
ERYTHROCYTE [DISTWIDTH] IN BLOOD BY AUTOMATED COUNT: 13.4 % (ref 11.7–15.4)
EST. GFR  (NO RACE VARIABLE): 51 ML/MIN/1.73
GLOBULIN SER CALC-MCNC: 2.3 G/DL (ref 1.5–4.5)
GLUCOSE SERPL-MCNC: 88 MG/DL (ref 70–99)
HBA1C MFR BLD: 6.4 % (ref 4.8–5.6)
HCT VFR BLD AUTO: 45 % (ref 34–46.6)
HDLC SERPL-MCNC: 50 MG/DL
HGB BLD-MCNC: 14.1 G/DL (ref 11.1–15.9)
IMM GRANULOCYTES # BLD AUTO: 0 X10E3/UL (ref 0–0.1)
IMM GRANULOCYTES NFR BLD AUTO: 0 %
LDLC SERPL CALC-MCNC: 68 MG/DL (ref 0–99)
LYMPHOCYTES # BLD AUTO: 3.3 X10E3/UL (ref 0.7–3.1)
LYMPHOCYTES NFR BLD AUTO: 36 %
MCH RBC QN AUTO: 26.8 PG (ref 26.6–33)
MCHC RBC AUTO-ENTMCNC: 31.3 G/DL (ref 31.5–35.7)
MCV RBC AUTO: 86 FL (ref 79–97)
MICROALBUMIN UR-MCNC: 14.6 UG/ML
MONOCYTES # BLD AUTO: 0.8 X10E3/UL (ref 0.1–0.9)
MONOCYTES NFR BLD AUTO: 9 %
NEUTROPHILS # BLD AUTO: 4.7 X10E3/UL (ref 1.4–7)
NEUTROPHILS NFR BLD AUTO: 51 %
PLATELET # BLD AUTO: 340 X10E3/UL (ref 150–450)
POTASSIUM SERPL-SCNC: 5.6 MMOL/L (ref 3.5–5.2)
PROT SERPL-MCNC: 6.6 G/DL (ref 6–8.5)
RBC # BLD AUTO: 5.26 X10E6/UL (ref 3.77–5.28)
SODIUM SERPL-SCNC: 144 MMOL/L (ref 134–144)
SPECIMEN STATUS REPORT: NORMAL
TRIGL SERPL-MCNC: 103 MG/DL (ref 0–149)
TSH SERPL DL<=0.005 MIU/L-ACNC: 2.56 UIU/ML (ref 0.45–4.5)
VLDLC SERPL CALC-MCNC: 19 MG/DL (ref 5–40)
WBC # BLD AUTO: 9.1 X10E3/UL (ref 3.4–10.8)

## 2024-10-31 ENCOUNTER — PATIENT OUTREACH (OUTPATIENT)
Dept: ADMINISTRATIVE | Facility: HOSPITAL | Age: 64
End: 2024-10-31
Payer: MEDICARE

## 2024-11-04 DIAGNOSIS — I73.00 RAYNAUD'S DISEASE WITHOUT GANGRENE: ICD-10-CM

## 2024-11-04 DIAGNOSIS — E78.5 HYPERLIPIDEMIA LDL GOAL <70: ICD-10-CM

## 2024-11-04 DIAGNOSIS — I10 BENIGN ESSENTIAL HTN: ICD-10-CM

## 2024-11-04 RX ORDER — AMLODIPINE BESYLATE 5 MG/1
TABLET ORAL
Qty: 180 TABLET | Refills: 1 | Status: SHIPPED | OUTPATIENT
Start: 2024-11-04

## 2024-11-04 RX ORDER — ROSUVASTATIN CALCIUM 10 MG/1
10 TABLET, COATED ORAL NIGHTLY
Qty: 90 TABLET | Refills: 3 | Status: SHIPPED | OUTPATIENT
Start: 2024-11-04

## 2024-11-11 ENCOUNTER — PATIENT MESSAGE (OUTPATIENT)
Dept: INTERNAL MEDICINE | Facility: CLINIC | Age: 64
End: 2024-11-11
Payer: MEDICARE

## 2024-11-11 DIAGNOSIS — R19.7 DIARRHEA, UNSPECIFIED TYPE: Primary | ICD-10-CM

## 2024-11-11 RX ORDER — FUROSEMIDE 20 MG/1
20 TABLET ORAL DAILY PRN
Qty: 30 TABLET | Refills: 5 | Status: SHIPPED | OUTPATIENT
Start: 2024-11-11 | End: 2025-11-11

## 2024-11-11 RX ORDER — DIPHENOXYLATE HYDROCHLORIDE AND ATROPINE SULFATE 2.5; .025 MG/1; MG/1
1 TABLET ORAL 4 TIMES DAILY PRN
Qty: 30 TABLET | Refills: 5 | Status: SHIPPED | OUTPATIENT
Start: 2024-11-11

## 2024-12-02 ENCOUNTER — PATIENT OUTREACH (OUTPATIENT)
Dept: ADMINISTRATIVE | Facility: HOSPITAL | Age: 64
End: 2024-12-02
Payer: MEDICARE

## 2024-12-02 NOTE — PROGRESS NOTES
Attempted to contact pt in reference to the remote BP report. Unable to contact. No answer message left.       Xolair Pregnancy And Lactation Text: This medication is Pregnancy Category B and is considered safe during pregnancy. This medication is excreted in breast milk.

## 2024-12-04 ENCOUNTER — PATIENT OUTREACH (OUTPATIENT)
Dept: ADMINISTRATIVE | Facility: HOSPITAL | Age: 64
End: 2024-12-04
Payer: MEDICARE

## 2024-12-12 ENCOUNTER — PATIENT OUTREACH (OUTPATIENT)
Dept: ADMINISTRATIVE | Facility: HOSPITAL | Age: 64
End: 2024-12-12
Payer: MEDICARE

## 2024-12-12 NOTE — PROGRESS NOTES
Attempted to contact pt in reference to overdue mammogram screening. Unable to contact. Message left

## 2025-01-14 DIAGNOSIS — Z00.00 ENCOUNTER FOR MEDICARE ANNUAL WELLNESS EXAM: ICD-10-CM

## 2025-02-04 ENCOUNTER — PATIENT OUTREACH (OUTPATIENT)
Dept: ADMINISTRATIVE | Facility: HOSPITAL | Age: 65
End: 2025-02-04
Payer: MEDICARE

## 2025-03-17 ENCOUNTER — PATIENT OUTREACH (OUTPATIENT)
Dept: INTERNAL MEDICINE | Facility: CLINIC | Age: 65
End: 2025-03-17
Payer: MEDICARE

## 2025-03-31 ENCOUNTER — PATIENT MESSAGE (OUTPATIENT)
Dept: ADMINISTRATIVE | Facility: HOSPITAL | Age: 65
End: 2025-03-31
Payer: MEDICARE

## 2025-05-02 ENCOUNTER — PATIENT OUTREACH (OUTPATIENT)
Dept: ADMINISTRATIVE | Facility: HOSPITAL | Age: 65
End: 2025-05-02
Payer: MEDICARE

## 2025-05-02 NOTE — PROGRESS NOTES
Portal active: yes  Chart reviewed, immunization record updated.  No new results noted on [facility] or [facility] web site.  Care Everywhere updated.   Patient care coordination note  LOV with PCP 10/17/24  Patient did not answer left message asking to call clinic to discuss mammmogram and b/p

## 2025-05-12 ENCOUNTER — PATIENT MESSAGE (OUTPATIENT)
Dept: ADMINISTRATIVE | Facility: HOSPITAL | Age: 65
End: 2025-05-12
Payer: MEDICARE

## 2025-05-20 DIAGNOSIS — R19.7 DIARRHEA, UNSPECIFIED TYPE: ICD-10-CM

## 2025-05-20 RX ORDER — DIPHENOXYLATE HYDROCHLORIDE AND ATROPINE SULFATE 2.5; .025 MG/1; MG/1
1 TABLET ORAL 4 TIMES DAILY PRN
Qty: 30 TABLET | Refills: 2 | Status: SHIPPED | OUTPATIENT
Start: 2025-05-20

## 2025-05-27 ENCOUNTER — PATIENT MESSAGE (OUTPATIENT)
Dept: INTERNAL MEDICINE | Facility: CLINIC | Age: 65
End: 2025-05-27
Payer: MEDICARE

## 2025-05-29 RX ORDER — METHYLPREDNISOLONE 4 MG/1
TABLET ORAL
Qty: 21 TABLET | Refills: 0 | Status: SHIPPED | OUTPATIENT
Start: 2025-05-29 | End: 2025-06-19

## 2025-06-09 ENCOUNTER — PATIENT MESSAGE (OUTPATIENT)
Dept: ADMINISTRATIVE | Facility: HOSPITAL | Age: 65
End: 2025-06-09
Payer: MEDICARE

## 2025-07-02 DIAGNOSIS — Z78.0 MENOPAUSE: ICD-10-CM

## 2025-08-04 ENCOUNTER — PATIENT MESSAGE (OUTPATIENT)
Dept: ADMINISTRATIVE | Facility: HOSPITAL | Age: 65
End: 2025-08-04
Payer: MEDICARE

## 2025-08-07 DIAGNOSIS — I10 BENIGN ESSENTIAL HTN: ICD-10-CM

## 2025-08-07 RX ORDER — AMLODIPINE BESYLATE 10 MG/1
10 TABLET ORAL NIGHTLY
Qty: 90 TABLET | Refills: 3 | Status: SHIPPED | OUTPATIENT
Start: 2025-08-07

## 2025-08-22 DIAGNOSIS — E78.5 HYPERLIPIDEMIA LDL GOAL <70: ICD-10-CM

## 2025-08-22 DIAGNOSIS — I10 BENIGN ESSENTIAL HTN: ICD-10-CM

## 2025-08-22 RX ORDER — LISINOPRIL 20 MG/1
20 TABLET ORAL DAILY
Qty: 90 TABLET | Refills: 3 | Status: SHIPPED | OUTPATIENT
Start: 2025-08-22

## 2025-08-22 RX ORDER — ROSUVASTATIN CALCIUM 10 MG/1
10 TABLET, COATED ORAL NIGHTLY
Qty: 90 TABLET | Refills: 3 | Status: SHIPPED | OUTPATIENT
Start: 2025-08-22